# Patient Record
Sex: FEMALE | Race: BLACK OR AFRICAN AMERICAN | Employment: FULL TIME | URBAN - METROPOLITAN AREA
[De-identification: names, ages, dates, MRNs, and addresses within clinical notes are randomized per-mention and may not be internally consistent; named-entity substitution may affect disease eponyms.]

---

## 2018-04-27 ENCOUNTER — HOSPITAL ENCOUNTER (OUTPATIENT)
Dept: MAMMOGRAPHY | Age: 53
Discharge: HOME | End: 2018-04-27
Attending: FAMILY MEDICINE
Payer: COMMERCIAL

## 2018-04-27 DIAGNOSIS — Z12.31 VISIT FOR SCREENING MAMMOGRAM: ICD-10-CM

## 2018-04-27 PROCEDURE — 77067 SCR MAMMO BI INCL CAD: CPT

## 2019-05-08 ENCOUNTER — HOSPITAL ENCOUNTER (OUTPATIENT)
Dept: MAMMOGRAPHY | Age: 54
Discharge: HOME | End: 2019-05-08
Attending: NURSE PRACTITIONER
Payer: COMMERCIAL

## 2019-05-08 DIAGNOSIS — Z12.31 VISIT FOR SCREENING MAMMOGRAM: ICD-10-CM

## 2019-05-08 PROCEDURE — 77067 SCR MAMMO BI INCL CAD: CPT

## 2020-11-23 ENCOUNTER — TRANSCRIBE ORDER (OUTPATIENT)
Dept: SCHEDULING | Age: 55
End: 2020-11-23

## 2020-11-23 DIAGNOSIS — Z12.31 ENCOUNTER FOR SCREENING MAMMOGRAM FOR MALIGNANT NEOPLASM OF BREAST: Primary | ICD-10-CM

## 2020-11-23 DIAGNOSIS — Z78.0 ASYMPTOMATIC POSTMENOPAUSAL STATE: ICD-10-CM

## 2020-12-29 ENCOUNTER — HOSPITAL ENCOUNTER (OUTPATIENT)
Dept: MAMMOGRAPHY | Age: 55
Discharge: HOME | End: 2020-12-29
Attending: INTERNAL MEDICINE
Payer: COMMERCIAL

## 2020-12-29 DIAGNOSIS — Z78.0 ASYMPTOMATIC POSTMENOPAUSAL STATE: ICD-10-CM

## 2020-12-29 DIAGNOSIS — Z12.31 ENCOUNTER FOR SCREENING MAMMOGRAM FOR MALIGNANT NEOPLASM OF BREAST: ICD-10-CM

## 2020-12-29 PROCEDURE — 77067 SCR MAMMO BI INCL CAD: CPT

## 2020-12-29 PROCEDURE — 77080 DXA BONE DENSITY AXIAL: CPT

## 2022-01-18 ENCOUNTER — TRANSCRIBE ORDER (OUTPATIENT)
Dept: SCHEDULING | Age: 57
End: 2022-01-18

## 2022-01-18 DIAGNOSIS — Z12.31 ENCOUNTER FOR SCREENING MAMMOGRAM FOR BREAST CANCER: Primary | ICD-10-CM

## 2022-02-04 ENCOUNTER — HOSPITAL ENCOUNTER (OUTPATIENT)
Dept: MAMMOGRAPHY | Age: 57
Discharge: HOME | End: 2022-02-04
Attending: INTERNAL MEDICINE
Payer: COMMERCIAL

## 2022-02-04 DIAGNOSIS — Z12.31 ENCOUNTER FOR SCREENING MAMMOGRAM FOR BREAST CANCER: ICD-10-CM

## 2022-02-04 PROCEDURE — 77067 SCR MAMMO BI INCL CAD: CPT

## 2023-02-06 ENCOUNTER — HOSPITAL ENCOUNTER (OUTPATIENT)
Dept: MAMMOGRAPHY | Age: 58
Discharge: HOME | End: 2023-02-09
Payer: COMMERCIAL

## 2023-02-06 DIAGNOSIS — Z12.31 OTHER SCREENING MAMMOGRAM: ICD-10-CM

## 2023-02-06 PROCEDURE — 77067 SCR MAMMO BI INCL CAD: CPT

## 2023-02-27 ENCOUNTER — OFFICE VISIT (OUTPATIENT)
Dept: ORTHOPEDIC SURGERY | Age: 58
End: 2023-02-27
Payer: COMMERCIAL

## 2023-02-27 VITALS — WEIGHT: 207 LBS

## 2023-02-27 DIAGNOSIS — G89.29 CHRONIC PAIN OF RIGHT KNEE: Primary | ICD-10-CM

## 2023-02-27 DIAGNOSIS — M25.561 CHRONIC PAIN OF RIGHT KNEE: Primary | ICD-10-CM

## 2023-02-27 DIAGNOSIS — G89.29 CHRONIC PAIN OF LEFT KNEE: ICD-10-CM

## 2023-02-27 DIAGNOSIS — M25.562 CHRONIC PAIN OF LEFT KNEE: ICD-10-CM

## 2023-02-27 DIAGNOSIS — M17.12 PRIMARY OSTEOARTHRITIS OF LEFT KNEE: ICD-10-CM

## 2023-02-27 DIAGNOSIS — M17.11 PRIMARY OSTEOARTHRITIS OF RIGHT KNEE: ICD-10-CM

## 2023-02-27 PROCEDURE — 20610 DRAIN/INJ JOINT/BURSA W/O US: CPT | Performed by: SPECIALIST/TECHNOLOGIST

## 2023-02-27 PROCEDURE — 99204 OFFICE O/P NEW MOD 45 MIN: CPT | Performed by: SPECIALIST/TECHNOLOGIST

## 2023-02-27 RX ORDER — DOLUTEGRAVIR SODIUM 50 MG/1
TABLET, FILM COATED ORAL
COMMUNITY
Start: 2023-01-31

## 2023-02-27 RX ORDER — MELOXICAM 15 MG/1
TABLET ORAL
COMMUNITY
Start: 2023-01-31 | End: 2023-02-27 | Stop reason: ALTCHOICE

## 2023-02-27 RX ORDER — TRIAMCINOLONE ACETONIDE 40 MG/ML
40 INJECTION, SUSPENSION INTRA-ARTICULAR; INTRAMUSCULAR ONCE
Status: COMPLETED | OUTPATIENT
Start: 2023-02-27 | End: 2023-02-27

## 2023-02-27 RX ORDER — DICLOFENAC SODIUM 75 MG/1
75 TABLET, DELAYED RELEASE ORAL 2 TIMES DAILY
Qty: 60 TABLET | Refills: 0 | Status: SHIPPED | OUTPATIENT
Start: 2023-02-27

## 2023-02-27 RX ADMIN — TRIAMCINOLONE ACETONIDE 40 MG: 40 INJECTION, SUSPENSION INTRA-ARTICULAR; INTRAMUSCULAR at 10:32

## 2023-02-27 NOTE — PROGRESS NOTES
Name: Yareli Wray  YOB: 1965  Gender: female  MRN: [id number]      CC: Knee Pain (Bilateral knee)       HPI: Yareli Wray is a 60 y.o. female who presents with Knee Pain (Bilateral knee)  Ms. Audra Bell is a new patient here for bilateral knee evaluation. She reports chronic bilateral knee pain with no injury. Notes that her right knee pain is worse than her left knee at this time. Her pain is primarily anterior, but diffuse through the knee. Also reports that she has a popping sensation in the right knee. She has had a prior cortisone injection years ago but cannot recall if it was unilateral or bilateral. She has been taking Slnvykpzo54 mg daily reports that the meloxicam has started to lose its efficacy. ROS/Meds/PSH/PMH/FH/SH: I personally reviewed the patients standard intake form. Below are the pertinents    Tobacco:  reports that she has never smoked. She does not have any smokeless tobacco history on file. Diabetes: none  Other: HIV    Physical Examination:  General: no acute distress  Lungs: breathing easily  CV: regular rhythm by pulse  Right Knee: Negative effusion present. Tenderness to palpation of the medial joint line. Full active and passive range of motion with no pain in the extremes. Negative ligamentous exam x4. Negative Ludmila's, bounce home test.  Left knee: Negative effusion present. Tenderness to palpation the medial joint line. Full active and passive range of motion with no pain in the extremes. Negative ligamentous exam x4. Negative Ludmila's, bounce home test.        Imaging:   Knee XR: 4 views     Clinical Indication  1. Chronic pain of right knee    2.  Chronic pain of left knee           Report: AP, lateral, PA flexion, sunrise views of the Bilateral knee demonstrates no acute fracture dislocation advanced degenerative changes localized to the medial compartment with osteophyte formation bilaterally    Impression: Bilateral DJD as above All imaging interpreted by myself MINA Fischer independent of radiology review        Assessment:     ICD-10-CM    1. Chronic pain of right knee  M25.561 XR KNEE BILATERAL STANDARD EXTENDED VW    G89.29 CANCELED: XR Knee Bilateral Standard      2. Chronic pain of left knee  M25.562 XR KNEE BILATERAL STANDARD EXTENDED VW    G89.29 CANCELED: XR Knee Bilateral Standard          Plan: Think the majority of the patient's bilateral knee pain is due to medial compartment advanced degenerative changes with osteoarthritis. I discussed with the patient the only surgical intervention for osteoarthritis would be total knee replacement. She does not wish to proceed with total knee replacement at this time and would like to proceed with conservative treatment options. I discussed treatment options to include corticosteroid injection, formal physical therapy, viscosupplementation and continued use of her NSAID. She reports that her Mobic is losing its efficacy therefore I will switch her from Mobic to Voltaren 75 mg twice daily. I will provide her with a home exercise program for quad strengthening and encouraged her to perform these exercises 2-3 times per week to maintain her lower extremity strength and decrease the stress on her joints. I think she would also benefit from corticosteroid injection which she agreed proceed with today. I will also refer her for approval for viscosupplementation. The patient elected to proceed with an intraarticular knee injection today. After verbal informed consent was obtained after sterile prep the Bilateral knee  was injected from a anterior lateral approach with 4 cc of 0.25% Marcaine and 1 cc of 40 mg Kenalog in each knee. The patient tolerated the procedure well was given postinjection flare precautions.             MINA Fischer  Orthopaedics and Sports Medicine

## 2023-09-22 ENCOUNTER — OFFICE VISIT (OUTPATIENT)
Dept: ORTHOPEDIC SURGERY | Age: 58
End: 2023-09-22

## 2023-09-22 DIAGNOSIS — M17.12 PRIMARY OSTEOARTHRITIS OF LEFT KNEE: Primary | ICD-10-CM

## 2023-09-22 DIAGNOSIS — M17.11 PRIMARY OSTEOARTHRITIS OF RIGHT KNEE: ICD-10-CM

## 2023-09-22 RX ORDER — TRIAMCINOLONE ACETONIDE 40 MG/ML
40 INJECTION, SUSPENSION INTRA-ARTICULAR; INTRAMUSCULAR ONCE
Status: COMPLETED | OUTPATIENT
Start: 2023-09-22 | End: 2023-09-22

## 2023-09-22 RX ADMIN — TRIAMCINOLONE ACETONIDE 40 MG: 40 INJECTION, SUSPENSION INTRA-ARTICULAR; INTRAMUSCULAR at 09:16

## 2023-09-22 NOTE — PROGRESS NOTES
Name: Apolinar Lebron  YOB: 1965  Gender: female  MRN: [id number]      CC: Knee Pain (B)       HPI: Apolinar Lebron is a 60 y.o. female who returns for follow up on  her bilateral knee pain. She was last seen in February 2023 and received bilateral knee injections. She reports that she got 100% relief from previous injections and wishes to repeat this today. She does however report that she is interested in having her knee pain addressed definitively with surgical consultation. Physical Examination:  General: no acute distress  Lungs: breathing easily  CV: regular rhythm by pulse  Right Knee: Negative effusion present. Tenderness to palpation of the medial joint line. Full active and passive range of motion with no pain in the extremes. Negative ligamentous exam x4. Negative Ludmila's, bounce home test.  Left knee: Negative effusion present. Tenderness to palpation the medial joint line. Full active and passive range of motion with no pain in the extremes. Negative ligamentous exam x4. Negative Ludmila's, bounce home test        Assessment:   1. Primary osteoarthritis of left knee    2. Primary osteoarthritis of right knee         Plan:   The patient bilateral knee pain continues to be due to osteoarthritis with changes worse on the left than the right with previous imaging. I discussed with the patient continued conservative treatment options include repeat corticosteroid injection and viscosupplementation however she reports that her viscosupplementation was not approved by her insurance. She was to have her acute pain addressed today however she also wishes to discuss her options for possible surgical intervention with a total joint surgeon. I informed the patient that having an injection today may delay her potential surgery up to 3 months due to potential infection risk and she states she is willing to proceed with the injection today despite this.   I will refer her to

## 2023-10-13 ENCOUNTER — OFFICE VISIT (OUTPATIENT)
Dept: ORTHOPEDIC SURGERY | Age: 58
End: 2023-10-13

## 2023-10-13 VITALS — BODY MASS INDEX: 37.25 KG/M2 | HEIGHT: 63 IN | WEIGHT: 210.2 LBS

## 2023-10-13 DIAGNOSIS — M25.562 PAIN IN BOTH KNEES, UNSPECIFIED CHRONICITY: Primary | ICD-10-CM

## 2023-10-13 DIAGNOSIS — M25.561 PAIN IN BOTH KNEES, UNSPECIFIED CHRONICITY: Primary | ICD-10-CM

## 2023-10-13 NOTE — PROGRESS NOTES
distention. Neuro: No obvious neurologic deficit. Grossly moves bilateral upper extremities without motor or sensory deficits. No gross weakness noted in the lower extremities. No hyporeflexia or hyperreflexia noted. Vascular: No gross arterial or venous deficiency noted. DP and PT pulses are palpable in the lower extremities  Lymphatic: No lymphedema noted in the lower extremities. Skin: No prior incisions noted about the right knee. No obvious rashes noted about the area. No skin changes noted about the knee or about the adjacent thigh or leg. Extremities:  Patient ambulates with an antalgic gait. There is pain with ROM of the right knee. Range of motion is 0-120. Trace effusion noted in the knee. Patellofemoral crepitus is present. Distally the patient shows no neurologic deficit. Xrays (obtained either today or previously)    Heading: Knee Xrays  Views: AP knee, skiers PA, lateral knee, sunrise view bilateral knee  Clinical indication: Bilateral Knee Pain   Findings: Xrays of the knees obtained today or previously show tricompartmental bone-on-bone arthritic changes of the bilateral knee with associated osteophyte formation and subchondral sclerosis. Impression: Bilateral Knee osteoarthritis    Guillermo Zheng MD      Assessment:   1. Arthritis of the Bilateral knee    Plan:  Differential diagnosis and treatment options have been discussed with the patient. Risks, benefits and alternatives of each were discussed and patient questions answered. The patient understands the nature of knee arthritis and that this is generally a progressive condition. At this point the patient has failed the aforementioned treatment modalities and would like to proceed with Total Knee Replacement. TREATMENT:    Total Knee Replacement- The patient has failed previous conservative treatment for this condition including anti-inflammatories, injections and lifestyle modifications.  The necessity for joint replacement is

## 2023-11-27 DIAGNOSIS — M17.12 PRIMARY OSTEOARTHRITIS OF LEFT KNEE: Primary | ICD-10-CM

## 2023-11-27 DIAGNOSIS — M21.962 ACQUIRED DEFORMITY OF LEFT KNEE: ICD-10-CM

## 2023-12-04 ENCOUNTER — TELEPHONE (OUTPATIENT)
Dept: ORTHOPEDIC SURGERY | Age: 58
End: 2023-12-04

## 2023-12-04 NOTE — TELEPHONE ENCOUNTER
Received the mutual [place] std form from , put in Dr Gaetano Edman Sherree Nyhan asst box to complete 12/4/23,MB

## 2024-01-02 ENCOUNTER — CLINICAL DOCUMENTATION (OUTPATIENT)
Dept: ORTHOPEDIC SURGERY | Age: 59
End: 2024-01-02

## 2024-01-02 NOTE — PROGRESS NOTES
Correction [employer]/FMLA form was a , called patient but she said she already had a copy, copy to scanning.

## 2024-01-12 ENCOUNTER — PREP FOR PROCEDURE (OUTPATIENT)
Dept: ORTHOPEDIC SURGERY | Age: 59
End: 2024-01-12

## 2024-01-12 ENCOUNTER — CLINICAL DOCUMENTATION (OUTPATIENT)
Dept: ORTHOPEDIC SURGERY | Age: 59
End: 2024-01-12

## 2024-01-12 DIAGNOSIS — Z01.818 PRE-OP EVALUATION: Primary | ICD-10-CM

## 2024-01-12 RX ORDER — SODIUM CHLORIDE 0.9 % (FLUSH) 0.9 %
5-40 SYRINGE (ML) INJECTION PRN
Status: CANCELLED | OUTPATIENT
Start: 2024-01-12

## 2024-01-12 RX ORDER — SODIUM CHLORIDE 0.9 % (FLUSH) 0.9 %
5-40 SYRINGE (ML) INJECTION EVERY 12 HOURS SCHEDULED
Status: CANCELLED | OUTPATIENT
Start: 2024-01-12

## 2024-01-12 RX ORDER — SODIUM CHLORIDE 9 MG/ML
INJECTION, SOLUTION INTRAVENOUS PRN
Status: CANCELLED | OUTPATIENT
Start: 2024-01-12

## 2024-01-16 ENCOUNTER — HOSPITAL ENCOUNTER (OUTPATIENT)
Dept: SURGERY | Age: 59
Discharge: HOME | End: 2024-01-19
Payer: COMMERCIAL

## 2024-01-16 ENCOUNTER — HOSPITAL ENCOUNTER (OUTPATIENT)
Dept: REHABILITATION | Age: 59
Discharge: HOME | End: 2024-01-19
Payer: COMMERCIAL

## 2024-01-16 VITALS
WEIGHT: 215 LBS | HEART RATE: 94 BPM | OXYGEN SATURATION: 100 % | SYSTOLIC BLOOD PRESSURE: 145 MMHG | HEIGHT: 63 IN | BODY MASS INDEX: 38.09 KG/M2 | TEMPERATURE: 98.6 F | RESPIRATION RATE: 16 BRPM | DIASTOLIC BLOOD PRESSURE: 73 MMHG

## 2024-01-16 DIAGNOSIS — Z01.818 PRE-OP EVALUATION: ICD-10-CM

## 2024-01-16 LAB
ALBUMIN SERPL-MCNC: 3.1 G/DL (ref 3.2–4.6)
ANION GAP SERPL CALC-SCNC: 4 MMOL/L (ref 2–11)
BASOPHILS # BLD: 0 K/UL (ref 0–0.2)
BASOPHILS NFR BLD: 1 % (ref 0–2)
BUN SERPL-MCNC: 12 MG/DL (ref 8–23)
CALCIUM SERPL-MCNC: 9.1 MG/DL (ref 8.3–10.4)
CHLORIDE SERPL-SCNC: 110 MMOL/L (ref 103–113)
CO2 SERPL-SCNC: 29 MMOL/L (ref 21–32)
CREAT SERPL-MCNC: 1.05 MG/DL (ref 0.6–1)
DIFFERENTIAL METHOD BLD: ABNORMAL
EKG ATRIAL RATE: 75 BPM
EKG DIAGNOSIS: NORMAL
EKG P AXIS: 63 DEGREES
EKG P-R INTERVAL: 150 MS
EKG Q-T INTERVAL: 358 MS
EKG QRS DURATION: 72 MS
EKG QTC CALCULATION (BAZETT): 399 MS
EKG R AXIS: 13 DEGREES
EKG T AXIS: -13 DEGREES
EKG VENTRICULAR RATE: 75 BPM
EOSINOPHIL # BLD: 0.2 K/UL (ref 0–0.8)
EOSINOPHIL NFR BLD: 4 % (ref 0.5–7.8)
ERYTHROCYTE [DISTWIDTH] IN BLOOD BY AUTOMATED COUNT: 12.9 % (ref 11.9–14.6)
EST. AVERAGE GLUCOSE BLD GHB EST-MCNC: 97 MG/DL
GLUCOSE SERPL-MCNC: 84 MG/DL (ref 65–100)
HBA1C MFR BLD: 5 % (ref 4.8–5.6)
HCT VFR BLD AUTO: 31.6 % (ref 35.8–46.3)
HGB BLD-MCNC: 10.3 G/DL (ref 11.7–15.4)
IMM GRANULOCYTES # BLD AUTO: 0 K/UL (ref 0–0.5)
IMM GRANULOCYTES NFR BLD AUTO: 0 % (ref 0–5)
INR PPP: 1.1
LYMPHOCYTES # BLD: 1.7 K/UL (ref 0.5–4.6)
LYMPHOCYTES NFR BLD: 27 % (ref 13–44)
MCH RBC QN AUTO: 29.4 PG (ref 26.1–32.9)
MCHC RBC AUTO-ENTMCNC: 32.6 G/DL (ref 31.4–35)
MCV RBC AUTO: 90.3 FL (ref 82–102)
MONOCYTES # BLD: 0.5 K/UL (ref 0.1–1.3)
MONOCYTES NFR BLD: 7 % (ref 4–12)
MRSA DNA SPEC QL NAA+PROBE: NOT DETECTED
NEUTS SEG # BLD: 3.8 K/UL (ref 1.7–8.2)
NEUTS SEG NFR BLD: 61 % (ref 43–78)
NRBC # BLD: 0 K/UL (ref 0–0.2)
PLATELET # BLD AUTO: 244 K/UL (ref 150–450)
PMV BLD AUTO: 9.6 FL (ref 9.4–12.3)
POTASSIUM SERPL-SCNC: 3.1 MMOL/L (ref 3.5–5.1)
PROTHROMBIN TIME: 13.4 SEC (ref 11.3–14.9)
RBC # BLD AUTO: 3.5 M/UL (ref 4.05–5.2)
S AUREUS CPE NOSE QL NAA+PROBE: NOT DETECTED
SODIUM SERPL-SCNC: 143 MMOL/L (ref 136–146)
WBC # BLD AUTO: 6.2 K/UL (ref 4.3–11.1)

## 2024-01-16 PROCEDURE — 97161 PT EVAL LOW COMPLEX 20 MIN: CPT

## 2024-01-16 PROCEDURE — 87641 MR-STAPH DNA AMP PROBE: CPT

## 2024-01-16 PROCEDURE — 85610 PROTHROMBIN TIME: CPT

## 2024-01-16 PROCEDURE — 85025 COMPLETE CBC W/AUTO DIFF WBC: CPT

## 2024-01-16 PROCEDURE — 93005 ELECTROCARDIOGRAM TRACING: CPT

## 2024-01-16 PROCEDURE — 80048 BASIC METABOLIC PNL TOTAL CA: CPT

## 2024-01-16 PROCEDURE — 93010 ELECTROCARDIOGRAM REPORT: CPT | Performed by: INTERNAL MEDICINE

## 2024-01-16 PROCEDURE — 80307 DRUG TEST PRSMV CHEM ANLYZR: CPT

## 2024-01-16 PROCEDURE — 83036 HEMOGLOBIN GLYCOSYLATED A1C: CPT

## 2024-01-16 PROCEDURE — 82040 ASSAY OF SERUM ALBUMIN: CPT

## 2024-01-16 RX ORDER — CETIRIZINE HYDROCHLORIDE 10 MG/1
10 TABLET ORAL DAILY
COMMUNITY

## 2024-01-16 RX ORDER — ALBUTEROL SULFATE 90 UG/1
2 AEROSOL, METERED RESPIRATORY (INHALATION) EVERY 6 HOURS PRN
COMMUNITY

## 2024-01-16 RX ORDER — MELOXICAM 15 MG/1
15 TABLET ORAL DAILY
COMMUNITY

## 2024-01-16 ASSESSMENT — KOOS JR
RISING FROM SITTING: 3
TWISING OR PIVOTING ON KNEE: 3
GOING UP OR DOWN STAIRS: 4
KOOS JR TOTAL INTERVAL SCORE: 31.307
STANDING UPRIGHT: 3
STRAIGHTENING KNEE FULLY: 3
BENDING TO THE FLOOR TO PICK UP OBJECT: 3
HOW SEVERE IS YOUR KNEE STIFFNESS AFTER FIRST WAKING IN MORNING: 3

## 2024-01-16 ASSESSMENT — PROMIS GLOBAL HEALTH SCALE
IN GENERAL, PLEASE RATE HOW WELL YOU CARRY OUT YOUR USUAL SOCIAL ACTIVITIES (INCLUDES ACTIVITIES AT HOME, AT WORK, AND IN YOUR COMMUNITY, AND RESPONSIBILITIES AS A PARENT, CHILD, SPOUSE, EMPLOYEE, FRIEND, ETC) [ON A SCALE OF 1 (POOR) TO 5 (EXCELLENT)]?: 4
IN GENERAL, HOW WOULD YOU RATE YOUR SATISFACTION WITH YOUR SOCIAL ACTIVITIES AND RELATIONSHIPS [ON A SCALE OF 1 (POOR) TO 5 (EXCELLENT)]?: 3
HOW IS THE PROMIS V1.1 BEING ADMINISTERED?: 0
IN THE PAST 7 DAYS, HOW WOULD YOU RATE YOUR PAIN ON AVERAGE [ON A SCALE FROM 0 (NO PAIN) TO 10 (WORST IMAGINABLE PAIN)]?: 9
SUM OF RESPONSES TO QUESTIONS 2, 4, 5, & 10: 15
IN GENERAL, HOW WOULD YOU RATE YOUR PHYSICAL HEALTH [ON A SCALE OF 1 (POOR) TO 5 (EXCELLENT)]?: 4
IN GENERAL, HOW WOULD YOU RATE YOUR MENTAL HEALTH, INCLUDING YOUR MOOD AND YOUR ABILITY TO THINK [ON A SCALE OF 1 (POOR) TO 5 (EXCELLENT)]?: 4
IN GENERAL, WOULD YOU SAY YOUR HEALTH IS...[ON A SCALE OF 1 (POOR) TO 5 (EXCELLENT)]: 4
IN GENERAL, WOULD YOU SAY YOUR QUALITY OF LIFE IS...[ON A SCALE OF 1 (POOR) TO 5 (EXCELLENT)]: 4
WHO IS THE PERSON COMPLETING THE PROMIS V1.1 SURVEY?: 0
SUM OF RESPONSES TO QUESTIONS 3, 6, 7, & 8: 18
IN THE PAST 7 DAYS, HOW OFTEN HAVE YOU BEEN BOTHERED BY EMOTIONAL PROBLEMS, SUCH AS FEELING ANXIOUS, DEPRESSED, OR IRRITABLE [ON A SCALE FROM 1 (NEVER) TO 5 (ALWAYS)]?: 4
IN THE PAST 7 DAYS, HOW WOULD YOU RATE YOUR FATIGUE ON AVERAGE [ON A SCALE FROM 1 (NONE) TO 5 (VERY SEVERE)]?: 3
TO WHAT EXTENT ARE YOU ABLE TO CARRY OUT YOUR EVERYDAY PHYSICAL ACTIVITIES SUCH AS WALKING, CLIMBING STAIRS, CARRYING GROCERIES, OR MOVING A CHAIR [ON A SCALE OF 1 (NOT AT ALL) TO 5 (COMPLETELY)]?: 2

## 2024-01-16 ASSESSMENT — PAIN DESCRIPTION - DESCRIPTORS: DESCRIPTORS: ACHING

## 2024-01-16 ASSESSMENT — PAIN SCALES - GENERAL: PAINLEVEL_OUTOF10: 0

## 2024-01-16 ASSESSMENT — PAIN DESCRIPTION - LOCATION: LOCATION: KNEE

## 2024-01-16 NOTE — PERIOP NOTE
PLEASE CONTINUE TAKING ALL PRESCRIPTION MEDICATIONS UP TO THE DAY OF SURGERY UNLESS OTHERWISE DIRECTED BELOW.     DISCONTINUE all over the counter vitamins, supplements, and herbals 21 days prior to surgery. DISCONTINUE Non-Steroidal Anti-Inflammatory (NSAIDS) such as Advil, Ibuprofen, Motrin, Naproxen, and Aleve 5 days prior to surgery.      *IT IS OK TO TAKE TYLENOL, Allergy medication, and indigestion medications*     Prescription medications to HOLD     Hold Meloxicam 5 days prior to surgery               CONTINUE all other prescriptions like normal up until the day of surgery--TAKE ONLY THE BELOW MEDICATIONS THE DAY OF SURGERY.    Albuterol inhaler if needed, Zyrtec               Comments   The day before surgery please take 2 Tylenol in the morning and then again before bed. You may use either regular or extra strength.        Bring Inhalers, CPAP, Dynahex soap, and incentive spirometer back to the hospital.          Please do not bring home medications with you on the day of surgery unless otherwise directed by your nurse.  If you are instructed to bring home medications, please give them to your nurse as they will be administered by the nursing staff.     If you have any questions, please call [facility] [phone].     A copy of this note was provided to the patient for reference.

## 2024-01-16 NOTE — PROGRESS NOTES
Total Joint Surgery Preoperative Chart Review      Patient ID:  Sierra Grace  [id number]  59 y.o.  1965  Surgeon: Dr. Aguilar  Date of Surgery: 02/07/2024  Procedure: Total Left Knee Arthroplasty  Primary Care Physician: Kennedi Diggs [phone]  Specialty Physician(s):      Subjective:   Sierra Grace is a 59 y.o. Black /  female who presents for preoperative evaluation for Total Left Knee arthroplasty.    This is a preoperative chart review note based on data collected by the nurse at the surgical Pre-Assessment visit.    Past Medical History:   Diagnosis Date    Asymptomatic postmenopausal state     Autoimmune disease (HCC)     HIV    Daytime somnolence     HIV (human immunodeficiency virus infection) (HCC) 12/2/2016    followed by PCP, per note (12/2023) \"Most recent labs performed on 12/1/2023 show a CD4 count of 1,030/51%-previously 831/49%-before that 921/54%-before that 950/53%-before that 843/50%. Viral load remains undetectable less than 20 which is normal for this patient.\"    CASSY on CPAP     newly dx--waiting for CPAP    Other ill-defined conditions(799.89)     Tooth abcess    Primary osteoarthritis of left knee 11/27/2023      Past Surgical History:   Procedure Laterality Date    ORTHOPEDIC SURGERY      R foot fx     Family History   Problem Relation Age of Onset    Breast Cancer Sister 42      Social History     Tobacco Use    Smoking status: Never    Smokeless tobacco: Not on file   Substance Use Topics    Alcohol use: No       Prior to Admission medications    Medication Sig Start Date End Date Taking? Authorizing Provider   albuterol sulfate HFA (VENTOLIN HFA) 108 (90 Base) MCG/ACT inhaler Inhale 2 puffs into the lungs every 6 hours as needed for Wheezing   Yes Provider, MD iKrit   cetirizine (ZYRTEC) 10 MG tablet Take 1 tablet by mouth daily   Yes Provider, MD Kirit   darunavir-cobicistat 800-150 MG TABS tablet Take 1 tablet by mouth at bedtime   Yes

## 2024-01-16 NOTE — PROGRESS NOTES
surgery.    EDUCATION: Education Given To: Patient, Family  Education Provided: Role of Therapy, Home Exercise Program  Education Method: Demonstration, Verbal  Education Outcome: Verbalized understanding    MEDICAL NECESSITY: Ms. Grace is expected to optimize herlower extremity strength and ROM in preparation for joint replacement surgery.    REASON FOR CONTINUED SERVICES: Achieve baseline assesment of musculoskeletal system, functional mobility and home environment., educate in PT HEP in preparation for surgery, educate in hospital plan of care.    COMPLIANCE WITH PROGRAM/EXERCISE: compliant most of the time.    TOTAL TREATMENT DURATION:  Time In: 0920  Time Out: 0930  Minutes: 10    Regarding Sierra Grace's therapy, I certify that the treatment plan above will be carried out by a therapist or under their direction.  Thank you for this referral,  MADDY ROE, PT

## 2024-01-16 NOTE — PERIOP NOTE
Patient verified name and .    Order for consent found in EHR and matches case posting; patient verified.     Type 3 surgery, joint assessment complete.    Labs per surgeon: CBC,BMP, PT/INR, HgbA1c, Albumin, Nicotine; results pending.  Labs per anesthesia protocol: no additional  EKG: Completed today; results to be reviewed by anesthesia. Charge nurse to f/u.       Pulmonary office note (24), PCP office note (24), and Oncology/Hematology office note (23)    MRSA/MSSA swab collected; pharmacy to review and dose antibiotic as appropriate.     Hospital approved surgical skin cleanser and instructions to return bottle on DOS given per hospital policy.    Patient provided with handouts including Guide to Surgery, Pain Management, Hand Hygiene, Blood Transfusion Education, and [place] [facility] Brochure.    Patient answered medical/surgical history questions at their best of ability. All prior to admission medications documented in [facility]. Original medication prescription bottle NOT visualized during patient appointment.     Patient instructed to hold all vitamins, supplements, herbals 3 weeks prior to surgery and NSAIDS/Meloxicam 5 days prior to surgery.     Patient teach back successful and patient demonstrates knowledge of instruction.

## 2024-01-16 NOTE — PERIOP NOTE
The below lab results are within anesthesia guidelines, no follow-up required. Labs automatically routed to ordering provider via Epic documentation. Per Emelina Hartman NP note (1/16/24) \"Low potassium today. Will repeat DOS.\" Order signed and held in EHR.        Latest Reference Range & Units 01/16/24 08:56   Sodium 136 - 146 mmol/L 143   Potassium 3.5 - 5.1 mmol/L 3.1 (L)   Chloride 103 - 113 mmol/L 110   CO2 21 - 32 mmol/L 29   BUN,BUNPL 8 - 23 MG/DL 12   Creatinine 0.6 - 1.0 MG/DL 1.05 (H)   Anion Gap 2 - 11 mmol/L 4   Est, Glom Filt Rate >60 ml/min/1.73m2 >60   Glucose, Random 65 - 100 mg/dL 84   CALCIUM, SERUM, 367104 8.3 - 10.4 MG/DL 9.1   Albumin 3.2 - 4.6 g/dL 3.1 (L)   Hemoglobin A1C 4.8 - 5.6 % 5.0   eAG (mg/dL) mg/dL 97   WBC 4.3 - 11.1 K/uL 6.2   RBC 4.05 - 5.2 M/uL 3.50 (L)   Hemoglobin Quant 11.7 - 15.4 g/dL 10.3 (L)   Hematocrit 35.8 - 46.3 % 31.6 (L)   MCV 82.0 - 102.0 FL 90.3   MCH 26.1 - 32.9 PG 29.4   MCHC 31.4 - 35.0 g/dL 32.6   MPV 9.4 - 12.3 FL 9.6   RDW 11.9 - 14.6 % 12.9   Platelet Count 150 - 450 K/uL 244   Neutrophils % 43 - 78 % 61   Lymphocyte % 13 - 44 % 27   Monocytes % 4.0 - 12.0 % 7   Eosinophils % 0.5 - 7.8 % 4   Basophils % 0.0 - 2.0 % 1   Neutrophils Absolute 1.7 - 8.2 K/UL 3.8   Lymphocytes Absolute 0.5 - 4.6 K/UL 1.7   Monocytes Absolute 0.1 - 1.3 K/UL 0.5   Eosinophils Absolute 0.0 - 0.8 K/UL 0.2   Basophils Absolute 0.0 - 0.2 K/UL 0.0   Differential Type -   AUTOMATED   Immature Granulocytes 0.0 - 5.0 % 0   Nucleated Red Blood Cells 0.0 - 0.2 K/uL 0.00   Absolute Immature Granulocyte 0.0 - 0.5 K/UL 0.0   Prothrombin Time 11.3 - 14.9 sec 13.4   INR -   1.1   MRSA/STAPH AUREUS DNA  Rpt   (L): Data is abnormally low  (H): Data is abnormally high  Rpt: View report in Results Review for more information

## 2024-01-17 LAB
COMMENT:: NORMAL
COTININE UR QL SCN: NEGATIVE NG/ML

## 2024-01-18 NOTE — PROGRESS NOTES
NICOTINE AND METABOLITES, URINE  Order: [id number]  Status: Final result       Visible to patient: Yes (seen)       Next appt: 02/02/2024 at 11:05 AM in Orthopedic Surgery (Ebenezer Aguilar MD)       Dx: Pre-op evaluation    0 Result Notes       Component  Ref Range & Units 1/16/24 0856 Resulting Agency   Cotinine Screen, Ur  Gahsfc=815 ng/mL Negative [facility]   Comment:    Comment [facility]   Comment: (NOTE)  This analysis is performed by immunoassay. Positive findings are  unconfirmed analytical test results; if results do not support  expected clinical finding, confirmation by an alternate methodology  is recommended. Patient metabolic variables, specific drug chemistry,  and specimen characteristics can affect test outcome.  Technical consultation is available at [email or web address], or  call toll free [phone].  Performed At: [facility]  [address], [place] [id number]  Erich Ott MD Ph:[phone]  Performed At: [facility]  [address], [place] [id number]  Adalgisa Kingsley PhD Ph:[phone]              Specimen Collected: 01/16/24 08:56 EST Last Resulted: 01/17/24 14:37 EST

## 2024-01-22 ENCOUNTER — CLINICAL DOCUMENTATION (OUTPATIENT)
Dept: ORTHOPEDIC SURGERY | Age: 59
End: 2024-01-22

## 2024-01-26 DIAGNOSIS — M17.12 PRIMARY OSTEOARTHRITIS OF LEFT KNEE: ICD-10-CM

## 2024-01-26 DIAGNOSIS — M21.962 ACQUIRED DEFORMITY OF LEFT KNEE: ICD-10-CM

## 2024-01-28 DIAGNOSIS — G89.18 POSTOPERATIVE PAIN: Primary | ICD-10-CM

## 2024-01-28 RX ORDER — CYCLOBENZAPRINE HCL 5 MG
5 TABLET ORAL 3 TIMES DAILY PRN
Qty: 30 TABLET | Refills: 0 | Status: SHIPPED | OUTPATIENT
Start: 2024-01-28 | End: 2024-02-07

## 2024-01-28 RX ORDER — OMEPRAZOLE 40 MG/1
40 CAPSULE, DELAYED RELEASE ORAL DAILY
Qty: 30 CAPSULE | Refills: 0 | Status: SHIPPED | OUTPATIENT
Start: 2024-01-28

## 2024-01-28 RX ORDER — GABAPENTIN 300 MG/1
300 CAPSULE ORAL 2 TIMES DAILY
Qty: 30 CAPSULE | Refills: 0 | Status: SHIPPED | OUTPATIENT
Start: 2024-01-28 | End: 2024-02-12

## 2024-01-28 RX ORDER — MELOXICAM 15 MG/1
15 TABLET ORAL DAILY
Qty: 30 TABLET | Refills: 2 | Status: SHIPPED | OUTPATIENT
Start: 2024-01-28

## 2024-01-28 RX ORDER — ACETAMINOPHEN 500 MG
1000 TABLET ORAL EVERY 6 HOURS PRN
Qty: 180 TABLET | Refills: 2 | Status: SHIPPED | OUTPATIENT
Start: 2024-01-28

## 2024-01-28 RX ORDER — ASPIRIN 81 MG/1
81 TABLET ORAL 2 TIMES DAILY
Qty: 60 TABLET | Refills: 0 | Status: SHIPPED | OUTPATIENT
Start: 2024-01-28

## 2024-01-28 RX ORDER — ZOLPIDEM TARTRATE 5 MG/1
5 TABLET ORAL NIGHTLY PRN
Qty: 60 TABLET | Refills: 0 | Status: SHIPPED | OUTPATIENT
Start: 2024-01-28 | End: 2024-03-28

## 2024-01-28 RX ORDER — OXYCODONE HYDROCHLORIDE 5 MG/1
10 TABLET ORAL EVERY 4 HOURS PRN
Qty: 60 TABLET | Refills: 0 | Status: SHIPPED | OUTPATIENT
Start: 2024-01-28 | End: 2024-02-04

## 2024-01-28 RX ORDER — SENNOSIDES A AND B 8.6 MG/1
1 TABLET, FILM COATED ORAL 2 TIMES DAILY
Qty: 30 TABLET | Refills: 2 | Status: SHIPPED | OUTPATIENT
Start: 2024-01-28

## 2024-01-28 RX ORDER — ONDANSETRON 4 MG/1
4 TABLET, FILM COATED ORAL 3 TIMES DAILY PRN
Qty: 30 TABLET | Refills: 1 | Status: SHIPPED | OUTPATIENT
Start: 2024-01-28

## 2024-02-02 ENCOUNTER — OFFICE VISIT (OUTPATIENT)
Dept: ORTHOPEDIC SURGERY | Age: 59
End: 2024-02-02

## 2024-02-02 DIAGNOSIS — M17.12 PRIMARY OSTEOARTHRITIS OF LEFT KNEE: Primary | ICD-10-CM

## 2024-02-02 NOTE — PROGRESS NOTES
Patient ID:  Sierra Grace  [id number]  59 y.o.  1965    Today: February 2, 2024          CC:  Left  Knee pain    HPI:   The patient has end stage arthritis of the left knee. The patient was evaluated and examined during consultation prior to today. The patient complains of knee pain with activities, reports pain as mostly occurring along the joint lines, reports stiffness of the knee after inactivity, and swelling/pain at the end of the day and after increased physical activity. The pain affects the patient’s activities of daily living and quality of life. The patient has attempted and exhausted conservative treatment. There have been no changes to the patient's orthopedic condition since the last office visit.    Past Medical History:  Past Medical History:   Diagnosis Date    Asymptomatic postmenopausal state     Autoimmune disease (HCC)     HIV    Daytime somnolence     HIV (human immunodeficiency virus infection) (HCC) 12/2/2016    followed by PCP, per note (12/2023) \"Most recent labs performed on 12/1/2023 show a CD4 count of 1,030/51%-previously 831/49%-before that 921/54%-before that 950/53%-before that 843/50%. Viral load remains undetectable less than 20 which is normal for this patient.\"    CASSY on CPAP     newly dx--waiting for CPAP    Other ill-defined conditions(823.89)     Tooth abcess    Primary osteoarthritis of left knee 11/27/2023       Past Surgical History:  Past Surgical History:   Procedure Laterality Date    ORTHOPEDIC SURGERY      R foot fx        Medications:     Prior to Admission medications    Medication Sig Start Date End Date Taking? Authorizing Provider   acetaminophen (TYLENOL) 500 MG tablet Take 2 tablets by mouth every 6 hours as needed for Pain 1/28/24   Ebenezer Aguilar MD   zolpidem (AMBIEN) 5 MG tablet Take 1 tablet by mouth nightly as needed for Sleep for up to 60 days. Max Daily Amount: 10 mg 1/28/24 3/28/24  Ebenezer Aguilar MD   aspirin (ASPIRIN 81) 81 MG EC

## 2024-02-07 ENCOUNTER — HOME HEALTH ADMISSION (OUTPATIENT)
Dept: HOME HEALTH SERVICES | Facility: HOME HEALTH | Age: 59
End: 2024-02-07
Payer: COMMERCIAL

## 2024-02-07 ENCOUNTER — ANESTHESIA EVENT (OUTPATIENT)
Dept: SURGERY | Age: 59
End: 2024-02-07
Payer: COMMERCIAL

## 2024-02-07 ENCOUNTER — HOSPITAL ENCOUNTER (OUTPATIENT)
Age: 59
Discharge: HOME HEALTH | End: 2024-02-07
Attending: ORTHOPAEDIC SURGERY | Admitting: ORTHOPAEDIC SURGERY
Payer: COMMERCIAL

## 2024-02-07 ENCOUNTER — ANESTHESIA (OUTPATIENT)
Dept: SURGERY | Age: 59
End: 2024-02-07
Payer: COMMERCIAL

## 2024-02-07 VITALS
HEIGHT: 63 IN | SYSTOLIC BLOOD PRESSURE: 140 MMHG | HEART RATE: 80 BPM | BODY MASS INDEX: 37.7 KG/M2 | WEIGHT: 212.74 LBS | DIASTOLIC BLOOD PRESSURE: 70 MMHG | TEMPERATURE: 97.5 F | OXYGEN SATURATION: 97 % | RESPIRATION RATE: 16 BRPM

## 2024-02-07 PROBLEM — M17.12 OSTEOARTHRITIS OF LEFT KNEE, UNSPECIFIED OSTEOARTHRITIS TYPE: Status: ACTIVE | Noted: 2024-02-07

## 2024-02-07 LAB — POTASSIUM BLD-SCNC: 3.9 MMOL/L (ref 3.5–5.1)

## 2024-02-07 PROCEDURE — C1713 ANCHOR/SCREW BN/BN,TIS/BN: HCPCS | Performed by: ORTHOPAEDIC SURGERY

## 2024-02-07 PROCEDURE — 6370000000 HC RX 637 (ALT 250 FOR IP): Performed by: ANESTHESIOLOGY

## 2024-02-07 PROCEDURE — 6360000002 HC RX W HCPCS: Performed by: NURSE PRACTITIONER

## 2024-02-07 PROCEDURE — 6360000002 HC RX W HCPCS: Performed by: STUDENT IN AN ORGANIZED HEALTH CARE EDUCATION/TRAINING PROGRAM

## 2024-02-07 PROCEDURE — 6360000002 HC RX W HCPCS: Performed by: ANESTHESIOLOGY

## 2024-02-07 PROCEDURE — 6360000002 HC RX W HCPCS: Performed by: ORTHOPAEDIC SURGERY

## 2024-02-07 PROCEDURE — 97530 THERAPEUTIC ACTIVITIES: CPT

## 2024-02-07 PROCEDURE — 84132 ASSAY OF SERUM POTASSIUM: CPT

## 2024-02-07 PROCEDURE — 2580000003 HC RX 258: Performed by: ANESTHESIOLOGY

## 2024-02-07 PROCEDURE — 2500000003 HC RX 250 WO HCPCS: Performed by: STUDENT IN AN ORGANIZED HEALTH CARE EDUCATION/TRAINING PROGRAM

## 2024-02-07 PROCEDURE — 97165 OT EVAL LOW COMPLEX 30 MIN: CPT

## 2024-02-07 PROCEDURE — 3700000000 HC ANESTHESIA ATTENDED CARE: Performed by: ORTHOPAEDIC SURGERY

## 2024-02-07 PROCEDURE — 3600000015 HC SURGERY LEVEL 5 ADDTL 15MIN: Performed by: ORTHOPAEDIC SURGERY

## 2024-02-07 PROCEDURE — 64447 NJX AA&/STRD FEMORAL NRV IMG: CPT | Performed by: ANESTHESIOLOGY

## 2024-02-07 PROCEDURE — 97535 SELF CARE MNGMENT TRAINING: CPT

## 2024-02-07 PROCEDURE — 94760 N-INVAS EAR/PLS OXIMETRY 1: CPT

## 2024-02-07 PROCEDURE — 2720000010 HC SURG SUPPLY STERILE: Performed by: ORTHOPAEDIC SURGERY

## 2024-02-07 PROCEDURE — 7100000001 HC PACU RECOVERY - ADDTL 15 MIN: Performed by: ORTHOPAEDIC SURGERY

## 2024-02-07 PROCEDURE — 2580000003 HC RX 258: Performed by: ORTHOPAEDIC SURGERY

## 2024-02-07 PROCEDURE — 3600000005 HC SURGERY LEVEL 5 BASE: Performed by: ORTHOPAEDIC SURGERY

## 2024-02-07 PROCEDURE — C1776 JOINT DEVICE (IMPLANTABLE): HCPCS | Performed by: ORTHOPAEDIC SURGERY

## 2024-02-07 PROCEDURE — 2709999900 HC NON-CHARGEABLE SUPPLY: Performed by: ORTHOPAEDIC SURGERY

## 2024-02-07 PROCEDURE — 7100000000 HC PACU RECOVERY - FIRST 15 MIN: Performed by: ORTHOPAEDIC SURGERY

## 2024-02-07 PROCEDURE — 6370000000 HC RX 637 (ALT 250 FOR IP): Performed by: NURSE PRACTITIONER

## 2024-02-07 PROCEDURE — 3700000001 HC ADD 15 MINUTES (ANESTHESIA): Performed by: ORTHOPAEDIC SURGERY

## 2024-02-07 PROCEDURE — 97161 PT EVAL LOW COMPLEX 20 MIN: CPT

## 2024-02-07 DEVICE — TIBIAL COMPONENT
Type: IMPLANTABLE DEVICE | Site: KNEE | Status: FUNCTIONAL
Brand: TRIATHLON

## 2024-02-07 DEVICE — POWDER SURG CELLERATE RX 1 GM HYDROL COLLEGEN: Type: IMPLANTABLE DEVICE | Site: KNEE | Status: FUNCTIONAL

## 2024-02-07 DEVICE — TIBIAL BEARING INSERT - CS
Type: IMPLANTABLE DEVICE | Site: KNEE | Status: FUNCTIONAL
Brand: TRIATHLON

## 2024-02-07 DEVICE — COMPONENT TOT KNEE CAPPED PRIMARY K2STRYKER] STRYKER CORP]: Type: IMPLANTABLE DEVICE | Status: FUNCTIONAL

## 2024-02-07 DEVICE — PATELLA
Type: IMPLANTABLE DEVICE | Site: KNEE | Status: FUNCTIONAL
Brand: TRIATHLON

## 2024-02-07 DEVICE — CRUCIATE RETAINING FEMORAL
Type: IMPLANTABLE DEVICE | Site: KNEE | Status: FUNCTIONAL
Brand: TRIATHLON

## 2024-02-07 RX ORDER — DIPHENHYDRAMINE HCL 25 MG
25 CAPSULE ORAL EVERY 6 HOURS PRN
Status: DISCONTINUED | OUTPATIENT
Start: 2024-02-07 | End: 2024-02-07 | Stop reason: HOSPADM

## 2024-02-07 RX ORDER — SODIUM CHLORIDE 0.9 % (FLUSH) 0.9 %
5-40 SYRINGE (ML) INJECTION PRN
Status: DISCONTINUED | OUTPATIENT
Start: 2024-02-07 | End: 2024-02-07 | Stop reason: HOSPADM

## 2024-02-07 RX ORDER — SODIUM CHLORIDE 0.9 % (FLUSH) 0.9 %
5-40 SYRINGE (ML) INJECTION EVERY 12 HOURS SCHEDULED
Status: DISCONTINUED | OUTPATIENT
Start: 2024-02-07 | End: 2024-02-07 | Stop reason: HOSPADM

## 2024-02-07 RX ORDER — PROCHLORPERAZINE EDISYLATE 5 MG/ML
5 INJECTION INTRAMUSCULAR; INTRAVENOUS
Status: DISCONTINUED | OUTPATIENT
Start: 2024-02-07 | End: 2024-02-07 | Stop reason: HOSPADM

## 2024-02-07 RX ORDER — HYDROMORPHONE HYDROCHLORIDE 1 MG/ML
0.5 INJECTION, SOLUTION INTRAMUSCULAR; INTRAVENOUS; SUBCUTANEOUS EVERY 5 MIN PRN
Status: DISCONTINUED | OUTPATIENT
Start: 2024-02-07 | End: 2024-02-07 | Stop reason: HOSPADM

## 2024-02-07 RX ORDER — ASPIRIN 81 MG/1
81 TABLET ORAL 2 TIMES DAILY
Status: DISCONTINUED | OUTPATIENT
Start: 2024-02-07 | End: 2024-02-07 | Stop reason: HOSPADM

## 2024-02-07 RX ORDER — NALOXONE HYDROCHLORIDE 0.4 MG/ML
0.4 INJECTION, SOLUTION INTRAMUSCULAR; INTRAVENOUS; SUBCUTANEOUS PRN
Status: DISCONTINUED | OUTPATIENT
Start: 2024-02-07 | End: 2024-02-07 | Stop reason: HOSPADM

## 2024-02-07 RX ORDER — LIDOCAINE HYDROCHLORIDE 10 MG/ML
1 INJECTION, SOLUTION INFILTRATION; PERINEURAL
Status: DISCONTINUED | OUTPATIENT
Start: 2024-02-07 | End: 2024-02-07 | Stop reason: HOSPADM

## 2024-02-07 RX ORDER — KETOROLAC TROMETHAMINE 30 MG/ML
INJECTION, SOLUTION INTRAMUSCULAR; INTRAVENOUS PRN
Status: DISCONTINUED | OUTPATIENT
Start: 2024-02-07 | End: 2024-02-07 | Stop reason: ALTCHOICE

## 2024-02-07 RX ORDER — PANTOPRAZOLE SODIUM 40 MG/1
40 TABLET, DELAYED RELEASE ORAL
Status: DISCONTINUED | OUTPATIENT
Start: 2024-02-08 | End: 2024-02-07 | Stop reason: HOSPADM

## 2024-02-07 RX ORDER — PROPOFOL 10 MG/ML
INJECTION, EMULSION INTRAVENOUS CONTINUOUS PRN
Status: DISCONTINUED | OUTPATIENT
Start: 2024-02-07 | End: 2024-02-07 | Stop reason: SDUPTHER

## 2024-02-07 RX ORDER — SENNA AND DOCUSATE SODIUM 50; 8.6 MG/1; MG/1
1 TABLET, FILM COATED ORAL 2 TIMES DAILY
Status: DISCONTINUED | OUTPATIENT
Start: 2024-02-07 | End: 2024-02-07 | Stop reason: HOSPADM

## 2024-02-07 RX ORDER — ONDANSETRON 4 MG/1
4 TABLET, ORALLY DISINTEGRATING ORAL EVERY 8 HOURS PRN
Status: DISCONTINUED | OUTPATIENT
Start: 2024-02-07 | End: 2024-02-07 | Stop reason: HOSPADM

## 2024-02-07 RX ORDER — ROPIVACAINE HYDROCHLORIDE 2 MG/ML
INJECTION, SOLUTION EPIDURAL; INFILTRATION; PERINEURAL
Status: COMPLETED | OUTPATIENT
Start: 2024-02-07 | End: 2024-02-07

## 2024-02-07 RX ORDER — TRANEXAMIC ACID 100 MG/ML
INJECTION, SOLUTION INTRAVENOUS PRN
Status: DISCONTINUED | OUTPATIENT
Start: 2024-02-07 | End: 2024-02-07 | Stop reason: SDUPTHER

## 2024-02-07 RX ORDER — TOBRAMYCIN 1.2 G/30ML
INJECTION, POWDER, LYOPHILIZED, FOR SOLUTION INTRAVENOUS PRN
Status: DISCONTINUED | OUTPATIENT
Start: 2024-02-07 | End: 2024-02-07 | Stop reason: ALTCHOICE

## 2024-02-07 RX ORDER — FENTANYL CITRATE 50 UG/ML
100 INJECTION, SOLUTION INTRAMUSCULAR; INTRAVENOUS
Status: COMPLETED | OUTPATIENT
Start: 2024-02-07 | End: 2024-02-07

## 2024-02-07 RX ORDER — FAMOTIDINE 20 MG/1
20 TABLET, FILM COATED ORAL ONCE
Status: COMPLETED | OUTPATIENT
Start: 2024-02-07 | End: 2024-02-07

## 2024-02-07 RX ORDER — OXYCODONE HCL 10 MG/1
10 TABLET, FILM COATED, EXTENDED RELEASE ORAL EVERY 12 HOURS SCHEDULED
Status: DISCONTINUED | OUTPATIENT
Start: 2024-02-07 | End: 2024-02-07 | Stop reason: HOSPADM

## 2024-02-07 RX ORDER — OXYCODONE HYDROCHLORIDE 5 MG/1
10 TABLET ORAL EVERY 4 HOURS PRN
Status: DISCONTINUED | OUTPATIENT
Start: 2024-02-07 | End: 2024-02-07 | Stop reason: HOSPADM

## 2024-02-07 RX ORDER — ONDANSETRON 4 MG/1
8 TABLET, ORALLY DISINTEGRATING ORAL EVERY 8 HOURS PRN
Status: DISCONTINUED | OUTPATIENT
Start: 2024-02-07 | End: 2024-02-07 | Stop reason: HOSPADM

## 2024-02-07 RX ORDER — SODIUM CHLORIDE 9 MG/ML
INJECTION, SOLUTION INTRAVENOUS PRN
Status: DISCONTINUED | OUTPATIENT
Start: 2024-02-07 | End: 2024-02-07 | Stop reason: HOSPADM

## 2024-02-07 RX ORDER — ALBUTEROL SULFATE 2.5 MG/3ML
2.5 SOLUTION RESPIRATORY (INHALATION) EVERY 6 HOURS PRN
Status: DISCONTINUED | OUTPATIENT
Start: 2024-02-07 | End: 2024-02-07 | Stop reason: HOSPADM

## 2024-02-07 RX ORDER — ACETAMINOPHEN 500 MG
1000 TABLET ORAL ONCE
Status: COMPLETED | OUTPATIENT
Start: 2024-02-07 | End: 2024-02-07

## 2024-02-07 RX ORDER — KETOROLAC TROMETHAMINE 15 MG/ML
15 INJECTION, SOLUTION INTRAMUSCULAR; INTRAVENOUS EVERY 8 HOURS
Status: DISCONTINUED | OUTPATIENT
Start: 2024-02-07 | End: 2024-02-07 | Stop reason: HOSPADM

## 2024-02-07 RX ORDER — DEXAMETHASONE SODIUM PHOSPHATE 10 MG/ML
INJECTION INTRAMUSCULAR; INTRAVENOUS PRN
Status: DISCONTINUED | OUTPATIENT
Start: 2024-02-07 | End: 2024-02-07 | Stop reason: SDUPTHER

## 2024-02-07 RX ORDER — FENTANYL CITRATE 50 UG/ML
INJECTION, SOLUTION INTRAMUSCULAR; INTRAVENOUS PRN
Status: DISCONTINUED | OUTPATIENT
Start: 2024-02-07 | End: 2024-02-07 | Stop reason: SDUPTHER

## 2024-02-07 RX ORDER — ONDANSETRON 2 MG/ML
INJECTION INTRAMUSCULAR; INTRAVENOUS PRN
Status: DISCONTINUED | OUTPATIENT
Start: 2024-02-07 | End: 2024-02-07 | Stop reason: SDUPTHER

## 2024-02-07 RX ORDER — SODIUM CHLORIDE 9 MG/ML
INJECTION, SOLUTION INTRAVENOUS CONTINUOUS
Status: DISCONTINUED | OUTPATIENT
Start: 2024-02-07 | End: 2024-02-07 | Stop reason: HOSPADM

## 2024-02-07 RX ORDER — DIPHENHYDRAMINE HYDROCHLORIDE 50 MG/ML
12.5 INJECTION INTRAMUSCULAR; INTRAVENOUS
Status: DISCONTINUED | OUTPATIENT
Start: 2024-02-07 | End: 2024-02-07 | Stop reason: HOSPADM

## 2024-02-07 RX ORDER — SODIUM CHLORIDE, SODIUM LACTATE, POTASSIUM CHLORIDE, CALCIUM CHLORIDE 600; 310; 30; 20 MG/100ML; MG/100ML; MG/100ML; MG/100ML
INJECTION, SOLUTION INTRAVENOUS CONTINUOUS
Status: DISCONTINUED | OUTPATIENT
Start: 2024-02-07 | End: 2024-02-07 | Stop reason: HOSPADM

## 2024-02-07 RX ORDER — OXYCODONE HYDROCHLORIDE 5 MG/1
10 TABLET ORAL ONCE
Status: COMPLETED | OUTPATIENT
Start: 2024-02-07 | End: 2024-02-07

## 2024-02-07 RX ORDER — OXYCODONE HYDROCHLORIDE 5 MG/1
5 TABLET ORAL ONCE
Status: COMPLETED | OUTPATIENT
Start: 2024-02-07 | End: 2024-02-07

## 2024-02-07 RX ORDER — ZOLPIDEM TARTRATE 5 MG/1
5 TABLET ORAL NIGHTLY PRN
Status: DISCONTINUED | OUTPATIENT
Start: 2024-02-07 | End: 2024-02-07 | Stop reason: HOSPADM

## 2024-02-07 RX ORDER — CYCLOBENZAPRINE HCL 5 MG
5 TABLET ORAL 3 TIMES DAILY PRN
Status: DISCONTINUED | OUTPATIENT
Start: 2024-02-07 | End: 2024-02-07 | Stop reason: HOSPADM

## 2024-02-07 RX ORDER — DEXAMETHASONE SODIUM PHOSPHATE 10 MG/ML
10 INJECTION INTRAMUSCULAR; INTRAVENOUS ONCE
Status: DISCONTINUED | OUTPATIENT
Start: 2024-02-08 | End: 2024-02-07 | Stop reason: HOSPADM

## 2024-02-07 RX ORDER — ONDANSETRON 2 MG/ML
4 INJECTION INTRAMUSCULAR; INTRAVENOUS EVERY 6 HOURS PRN
Status: DISCONTINUED | OUTPATIENT
Start: 2024-02-07 | End: 2024-02-07 | Stop reason: HOSPADM

## 2024-02-07 RX ORDER — OXYCODONE HYDROCHLORIDE 5 MG/1
5 TABLET ORAL EVERY 4 HOURS PRN
Status: DISCONTINUED | OUTPATIENT
Start: 2024-02-07 | End: 2024-02-07 | Stop reason: HOSPADM

## 2024-02-07 RX ORDER — GABAPENTIN 300 MG/1
300 CAPSULE ORAL 2 TIMES DAILY
Status: DISCONTINUED | OUTPATIENT
Start: 2024-02-07 | End: 2024-02-07 | Stop reason: HOSPADM

## 2024-02-07 RX ORDER — ACETAMINOPHEN 500 MG
1000 TABLET ORAL EVERY 6 HOURS
Status: DISCONTINUED | OUTPATIENT
Start: 2024-02-07 | End: 2024-02-07 | Stop reason: HOSPADM

## 2024-02-07 RX ORDER — ROPIVACAINE HYDROCHLORIDE 2 MG/ML
INJECTION, SOLUTION EPIDURAL; INFILTRATION; PERINEURAL PRN
Status: DISCONTINUED | OUTPATIENT
Start: 2024-02-07 | End: 2024-02-07 | Stop reason: ALTCHOICE

## 2024-02-07 RX ORDER — HYDROMORPHONE HYDROCHLORIDE 1 MG/ML
1 INJECTION, SOLUTION INTRAMUSCULAR; INTRAVENOUS; SUBCUTANEOUS
Status: DISCONTINUED | OUTPATIENT
Start: 2024-02-07 | End: 2024-02-07 | Stop reason: HOSPADM

## 2024-02-07 RX ORDER — DIPHENHYDRAMINE HYDROCHLORIDE 50 MG/ML
25 INJECTION INTRAMUSCULAR; INTRAVENOUS EVERY 6 HOURS PRN
Status: DISCONTINUED | OUTPATIENT
Start: 2024-02-07 | End: 2024-02-07 | Stop reason: HOSPADM

## 2024-02-07 RX ORDER — MIDAZOLAM HYDROCHLORIDE 2 MG/2ML
2 INJECTION, SOLUTION INTRAMUSCULAR; INTRAVENOUS
Status: COMPLETED | OUTPATIENT
Start: 2024-02-07 | End: 2024-02-07

## 2024-02-07 RX ORDER — TRANEXAMIC ACID 650 MG/1
1300 TABLET ORAL
Status: DISCONTINUED | OUTPATIENT
Start: 2024-02-07 | End: 2024-02-07 | Stop reason: HOSPADM

## 2024-02-07 RX ORDER — MIDAZOLAM HYDROCHLORIDE 1 MG/ML
INJECTION INTRAMUSCULAR; INTRAVENOUS PRN
Status: DISCONTINUED | OUTPATIENT
Start: 2024-02-07 | End: 2024-02-07 | Stop reason: SDUPTHER

## 2024-02-07 RX ORDER — VANCOMYCIN HYDROCHLORIDE 1 G/20ML
INJECTION, POWDER, LYOPHILIZED, FOR SOLUTION INTRAVENOUS PRN
Status: DISCONTINUED | OUTPATIENT
Start: 2024-02-07 | End: 2024-02-07 | Stop reason: ALTCHOICE

## 2024-02-07 RX ADMIN — ROPIVACAINE HYDROCHLORIDE 20 ML: 2 INJECTION, SOLUTION EPIDURAL; INFILTRATION at 06:36

## 2024-02-07 RX ADMIN — DEXAMETHASONE SODIUM PHOSPHATE 4 MG: 10 INJECTION INTRAMUSCULAR; INTRAVENOUS at 07:17

## 2024-02-07 RX ADMIN — ACETAMINOPHEN 1000 MG: 500 TABLET, FILM COATED ORAL at 15:05

## 2024-02-07 RX ADMIN — PHENYLEPHRINE HYDROCHLORIDE 100 MCG: 0.1 INJECTION, SOLUTION INTRAVENOUS at 07:18

## 2024-02-07 RX ADMIN — FENTANYL CITRATE 50 MCG: 50 INJECTION, SOLUTION INTRAMUSCULAR; INTRAVENOUS at 08:27

## 2024-02-07 RX ADMIN — FAMOTIDINE 20 MG: 20 TABLET, FILM COATED ORAL at 05:58

## 2024-02-07 RX ADMIN — SODIUM CHLORIDE, SODIUM LACTATE, POTASSIUM CHLORIDE, AND CALCIUM CHLORIDE: 600; 310; 30; 20 INJECTION, SOLUTION INTRAVENOUS at 08:28

## 2024-02-07 RX ADMIN — ONDANSETRON 10 MG: 2 INJECTION INTRAMUSCULAR; INTRAVENOUS at 07:17

## 2024-02-07 RX ADMIN — TRANEXAMIC ACID 1300 MG: 650 TABLET ORAL at 15:05

## 2024-02-07 RX ADMIN — OXYCODONE 10 MG: 5 TABLET ORAL at 08:58

## 2024-02-07 RX ADMIN — MIDAZOLAM 1 MG: 1 INJECTION INTRAMUSCULAR; INTRAVENOUS at 07:00

## 2024-02-07 RX ADMIN — Medication 2000 MG: at 07:02

## 2024-02-07 RX ADMIN — FENTANYL CITRATE 25 MCG: 50 INJECTION, SOLUTION INTRAMUSCULAR; INTRAVENOUS at 08:08

## 2024-02-07 RX ADMIN — KETOROLAC TROMETHAMINE 15 MG: 15 INJECTION, SOLUTION INTRAMUSCULAR; INTRAVENOUS at 15:05

## 2024-02-07 RX ADMIN — PHENYLEPHRINE HYDROCHLORIDE 150 MCG: 0.1 INJECTION, SOLUTION INTRAVENOUS at 07:07

## 2024-02-07 RX ADMIN — OXYCODONE HYDROCHLORIDE 10 MG: 5 TABLET ORAL at 15:05

## 2024-02-07 RX ADMIN — MIDAZOLAM 1 MG: 1 INJECTION INTRAMUSCULAR; INTRAVENOUS at 06:50

## 2024-02-07 RX ADMIN — MIDAZOLAM 2 MG: 1 INJECTION INTRAMUSCULAR; INTRAVENOUS at 06:37

## 2024-02-07 RX ADMIN — FENTANYL CITRATE 50 MCG: 50 INJECTION INTRAMUSCULAR; INTRAVENOUS at 06:37

## 2024-02-07 RX ADMIN — SODIUM CHLORIDE, SODIUM LACTATE, POTASSIUM CHLORIDE, AND CALCIUM CHLORIDE: 600; 310; 30; 20 INJECTION, SOLUTION INTRAVENOUS at 06:28

## 2024-02-07 RX ADMIN — FENTANYL CITRATE 25 MCG: 50 INJECTION, SOLUTION INTRAMUSCULAR; INTRAVENOUS at 06:50

## 2024-02-07 RX ADMIN — ACETAMINOPHEN 1000 MG: 500 TABLET, FILM COATED ORAL at 05:59

## 2024-02-07 RX ADMIN — PROPOFOL 75 MCG/KG/MIN: 10 INJECTION, EMULSION INTRAVENOUS at 07:02

## 2024-02-07 RX ADMIN — VANCOMYCIN HYDROCHLORIDE 1500 MG: 10 INJECTION, POWDER, LYOPHILIZED, FOR SOLUTION INTRAVENOUS at 06:26

## 2024-02-07 RX ADMIN — TRANEXAMIC ACID 1000 MG: 100 INJECTION, SOLUTION INTRAVENOUS at 08:03

## 2024-02-07 RX ADMIN — PHENYLEPHRINE HYDROCHLORIDE 100 MCG: 0.1 INJECTION, SOLUTION INTRAVENOUS at 07:58

## 2024-02-07 ASSESSMENT — KOOS JR
RISING FROM SITTING: 2
HOW SEVERE IS YOUR KNEE STIFFNESS AFTER FIRST WAKING IN MORNING: 2
GOING UP OR DOWN STAIRS: 2
KOOS JR TOTAL INTERVAL SCORE: 52.465
BENDING TO THE FLOOR TO PICK UP OBJECT: 2
TWISING OR PIVOTING ON KNEE: 2
STRAIGHTENING KNEE FULLY: 2
STANDING UPRIGHT: 2

## 2024-02-07 ASSESSMENT — PAIN - FUNCTIONAL ASSESSMENT: PAIN_FUNCTIONAL_ASSESSMENT: 0-10

## 2024-02-07 ASSESSMENT — PAIN DESCRIPTION - DESCRIPTORS
DESCRIPTORS: ACHING
DESCRIPTORS: ACHING

## 2024-02-07 ASSESSMENT — PAIN SCALES - GENERAL
PAINLEVEL_OUTOF10: 0
PAINLEVEL_OUTOF10: 8
PAINLEVEL_OUTOF10: 8

## 2024-02-07 ASSESSMENT — PAIN DESCRIPTION - LOCATION: LOCATION: KNEE

## 2024-02-07 ASSESSMENT — PAIN DESCRIPTION - ORIENTATION: ORIENTATION: POSTERIOR

## 2024-02-07 ASSESSMENT — PAIN DESCRIPTION - PAIN TYPE: TYPE: SURGICAL PAIN

## 2024-02-07 NOTE — PERIOP NOTE
TRANSFER - OUT REPORT:    Verbal report given to Kita AMIN on Sierra Grace  being transferred to Room 331 for routine progression of patient care       Report consisted of patient's Situation, Background, Assessment and   Recommendations(SBAR).     Information from the following report(s) Nurse Handoff Report, Adult Overview, and MAR was reviewed with the receiving nurse.           Lines:   Peripheral IV 02/07/24 Posterior;Right Antecubital (Active)   Site Assessment Clean, dry & intact 02/07/24 0903   Line Status Infusing 02/07/24 0903   Line Care Connections checked and tightened 02/07/24 0903   Phlebitis Assessment No symptoms 02/07/24 0903   Infiltration Assessment 0 02/07/24 0903   Dressing Status Clean, dry & intact 02/07/24 0903   Dressing Type Transparent 02/07/24 0903        Opportunity for questions and clarification was provided.      Patient transported with:  O2 @ 0lpm

## 2024-02-07 NOTE — PROGRESS NOTES
Dr. Jarquin reviewed chart. No order received. Ok to proceed.    
Had therapy. Has voided. Has prescriptions for home already. Home health arranged for therapy. Has follow up appt scheduled. Discharge instructions given. Going to car via wheelchair.  
OCCUPATIONAL THERAPY Initial Assessment, Daily Note, and PM      (Link to Caseload Tracking: OT Visit Days: 1  OT Orders   Time  OT Charge Capture  Rehab Caseload Tracker  Episode     Sierra Grace is a 59 y.o. female   PRIMARY DIAGNOSIS: Osteoarthritis of left knee, unspecified osteoarthritis type  Primary osteoarthritis of left knee [M17.12]  Acquired deformity of left knee [M21.962]  Osteoarthritis of left knee, unspecified osteoarthritis type [M17.12]  Procedure(s) (LRB):  LEFT KNEE TOTAL ARTHROPLASTY ROBOTIC, Ramy/TRAVIS, Prevena, Vanc, Cellerate, Tobra/ SDD (Left)  Day of Surgery  Reason for Referral: Pain in Left Knee (M25.562)  Stiffness of Left Knee, Not elsewhere classified (M25.662)  Generalized Muscle Weakness (M62.81)  Other lack of cordination (R27.8)  Difficulty in walking, Not elsewhere classified (R26.2)  Other abnormalities of gait and mobility (R26.89)  Outpatient in a bed: Payor: [employer] / [place] / Product Type: *No Product type* /     ASSESSMENT:     REHAB RECOMMENDATIONS:   Recommendation to date pending progress:  Setting:  No further skilled occupational therapy after discharge from hospital    Equipment:    Rolling Walker     ASSESSMENT:  Ms. Grace is s/p left TKA with small wound vac and presents with decreased independence with functional mobility and activities of daily living as compared to baseline level of function and safety. Patient would benefit from skilled Occupational Therapy to maximize independence and safety with self-care task and functional mobility.   Patient supine in bed CGA supine to sit. She ambulated to the restroom with CGA. She toileted and donned a clean brief with cga. She returned to recliner  and dressed with min assist. She was set up in her recliner with her ice machine. She plans to discharge home today with good support from spouse and family. She was educated on OT poc, discharge planning, aDL task performance and post op showering. Wound vac 
TRANSFER - IN REPORT:    Verbal report received from Bonita Goldman RN on Sierra Grace  being received from PACU for routine post-op      Report consisted of patient's Situation, Background, Assessment and   Recommendations(SBAR).     Information from the following report(s) Nurse Handoff Report was reviewed with the receiving nurse.    Opportunity for questions and clarification was provided.      Assessment completed upon patient's arrival to unit and care assumed.   Oriented to room and bed controls. Prevena to L knee with iceman. Yellow gripper socks to feet. Moving feet and has sensation.  
    TIME IN/OUT:  Time In: 1355  Time Out: 1435  Minutes: 40    Sarah Maldonado, PT

## 2024-02-07 NOTE — DISCHARGE INSTRUCTIONS
a while before you sit again. If you must sit for a long time, prop up your leg with a chair or footstool. This will help you avoid swelling.     Ask your doctor when you can drive again. It may take several weeks after knee replacement surgery before it's safe for you to drive.     When you get into a car, sit on the edge of the seat. Then pull in your legs, and turn to face the front.     You should be able to do many everyday activities 3 to 6 weeks after your surgery. You will probably need to take 4 to 16 weeks off from work. When you can go back to work depends on the type of work you do and how you feel.     Ask your doctor when it is okay for you to have sex.     For 12 weeks, do not lift anything heavier than 10 pounds and do not lift weights.   Diet    By the time you leave the hospital, you should be eating your normal diet. If your stomach is upset, try bland, low-fat foods like plain rice, broiled chicken, toast, and yogurt. Your doctor may suggest that you take iron and vitamin supplements.     Drink plenty of fluids (unless your doctor tells you not to).     Eat healthy foods, and watch your portion sizes. Try to stay at your ideal weight. Too much weight puts more stress on your new knee.     You may notice that your bowel movements are not regular right after your surgery. This is common. Try to avoid constipation and straining with bowel movements. Drinking enough fluids, taking a stool softener, and eating foods that are good sources of fiber can help you avoid constipation. If you have not had a bowel movement after a couple of days, talk to your doctor.   Medicines    Your doctor will tell you if and when you can restart your medicines. You will also get instructions about taking any new medicines.     If you stopped taking aspirin or some other blood thinner, your doctor will tell you when to start taking it again.     Your doctor may give you a blood-thinning medicine to prevent blood clots.

## 2024-02-07 NOTE — OP NOTE
prosthesis. During this time we removed the previously placed femoral and tibial sensors and array pins and finished injection our periarticular cocktail. The wound was irrigated with normal saline again before closure.    Prior to capsular closure one gram of vancomycin powder and 1.2 grams of tobramycin powder was placed within the capsular layer. The capsular layer was closed using a combination of 0-Stratafix bidirectional barbed suture and #2 Fiberwire. One batch of Cellerate collagen powder was placed between the capsular layer closure and the subcutaneous layer closure.  The subcutaneous layer was closed using a 2-0 Monocril interrupted suture. The skin was closed using staples. A sterile Prevena dressing was applied. The sponge count and needle counts were correct. Patient was transferred to the hospital stretcher and transported to recovery in stable condition.    Signed By: Ebenezer Aguilar MD

## 2024-02-07 NOTE — RT PROTOCOL NOTE
frequently as needed for the individual patient.     II. Purpose:     To provide a process that will allow for ongoing assessment and care plan modification for patients receiving respiratory services based on both objective and or subjective patient responses to interventions. This process of protocol utilization will assist in patient care progression while eliminating the need for the physician to continually update respiratory therapy orders.   To assure continuity of respiratory care that meets [facility] Clinical Practice Guidelines.    III. Initiation:  Implement Respiratory Care Protocols for patients who are ordered by physician          to receive respiratory therapy procedures or for ventilator management.     IV. Protocol:  Upon receiving an order for therapy the RCP will review the patient's EMR (electronic medical record) for all pertinent information including:  Physician's order for therapy  Patient history and physical examination  Physician progress notes  Diagnostic. X-rays, PFT's, arterial blood gases etc.  The RCP will perform a respiratory assessment in the following manner:  General observations: color, pattern and effort of breathing, chest expansion, (symmetrical and bilateral), level of consciousness and the ability to ambulate.  The RCP will assess patient's cough ability and determine if bronchial hygiene is needed. If patient is unable to produce sputum, at that time, the RCP should question the patient with regard to their sputum: production, color consistency, frequency and amount.  Auscultation: Using a stethoscope, the RCP will listen and note quality of breath sounds and presence or absence of adventitious breath sounds in all lung fields, both anteriorly and posteriorly.  Upon completing the EMR review and physical assessment, the RCP will document findings in the “RT Assessment section” of the EMR. The score level will be provided and will be used to determine the frequency of

## 2024-02-07 NOTE — ANESTHESIA POSTPROCEDURE EVALUATION
Department of Anesthesiology  Postprocedure Note    Patient: Sierra Grace  MRN: [id number]  YOB: 1965  Date of evaluation: 2/7/2024    Procedure Summary       Date: 02/07/24 Room / Location: [facility] MA[facility] OR 06 / E MAIN OR    Anesthesia Start: 0645 Anesthesia Stop: 0839    Procedure: LEFT KNEE TOTAL ARTHROPLASTY ROBOTIC, Ramy/TRAVIS, Prevena, Vanc, Cellerate, Tobra/ SDD (Left: Knee) Diagnosis:       Primary osteoarthritis of left knee      Acquired deformity of left knee      (Primary osteoarthritis of left knee [M17.12])      (Acquired deformity of left knee [M21.962])    Surgeons: Ebenezer Aguilar MD Responsible Provider: KIKA Monahan MD    Anesthesia Type: spinal ASA Status: 3            Anesthesia Type: No value filed.    Linda Phase I: Linda Score: 10    Linda Phase II:      Anesthesia Post Evaluation    Patient location during evaluation: PACU  Patient participation: complete - patient participated  Level of consciousness: awake and alert  Airway patency: patent  Nausea & Vomiting: no nausea and no vomiting  Cardiovascular status: hemodynamically stable  Respiratory status: acceptable, nonlabored ventilation and spontaneous ventilation  Hydration status: euvolemic  Comments: BP (!) 145/83   Pulse 78   Temp 97.7 °F (36.5 °C) (Oral)   Resp 16   Ht 1.6 m (5' 2.99\")   Wt 96.5 kg (212 lb 11.9 oz)   SpO2 97%   BMI 37.70 kg/m²     Multimodal analgesia pain management approach  Pain management: adequate and satisfactory to patient    No notable events documented.

## 2024-02-07 NOTE — ANESTHESIA PROCEDURE NOTES
findings.  Ultrasound imaged printed and placed on chart.    Medications Administered  ropivacaine (NAROPIN) injection 0.2% - Perineural   20 mL - 2/7/2024 6:36:00 AM

## 2024-02-07 NOTE — H&P
Topics    Alcohol use: No         Allergies:      Allergies   Allergen Reactions    Penicillins Hives        Vitals:   BP (!) 151/83   Pulse 72   Temp 97 °F (36.1 °C) (Temporal)   Resp 18   Ht 1.6 m (5' 2.99\")   Wt 96.5 kg (212 lb 11.9 oz)   SpO2 100%   BMI 37.70 kg/m²       Objective:         General: No Acute distress                   HEENT: Normocephalic/atramatic                   Lungs:  Breathing non-labored                   Heart:   RRR                    Abdomen: soft       Extremities:  Prior exam done in office has been consistent with end-stage knee arthritis. The patient has noted pain with ROM of the left knee. Trace effusion. Crepitus   present. Distally the patient shows no neurologic deficit. Antalgic gait appreciated.     Meds:   Current Facility-Administered Medications   Medication Dose Route Frequency    lidocaine 1 % injection 1 mL  1 mL IntraDERmal Once PRN    fentaNYL (SUBLIMAZE) injection 100 mcg  100 mcg IntraVENous Once PRN    lactated ringers IV soln infusion   IntraVENous Continuous    sodium chloride flush 0.9 % injection 5-40 mL  5-40 mL IntraVENous 2 times per day    sodium chloride flush 0.9 % injection 5-40 mL  5-40 mL IntraVENous PRN    0.9 % sodium chloride infusion   IntraVENous PRN    midazolam PF (VERSED) injection 2 mg  2 mg IntraVENous Once PRN    ceFAZolin (ANCEF) 2000 mg in sterile water 20 mL IV syringe  2,000 mg IntraVENous On Call to OR    sodium chloride flush 0.9 % injection 5-40 mL  5-40 mL IntraVENous 2 times per day    sodium chloride flush 0.9 % injection 5-40 mL  5-40 mL IntraVENous PRN    0.9 % sodium chloride infusion   IntraVENous PRN    vancomycin (VANCOCIN) 1500 mg in sodium chloride 0.9 % 250 mL IVPB  1,500 mg IntraVENous Once       Patient Active Problem List   Diagnosis    Obesity (BMI 30-39.9)    HIV (human immunodeficiency virus infection) (HCC)    Hypersomnia    CASSY (obstructive sleep apnea)    Menopause    Primary osteoarthritis of left knee

## 2024-02-07 NOTE — ANESTHESIA PRE PROCEDURE
GI/Hepatic/Renal:   (+) morbid obesity          Endo/Other:                     Abdominal:             Vascular:          Other Findings:       Anesthesia Plan      spinal     ASA 3           MIPS: Prophylactic antiemetics administered.  Anesthetic plan and risks discussed with patient.              Post-op pain plan if not by surgeon: single peripheral nerve block        KIKA BUSTAMANTE MD   2/7/2024

## 2024-02-07 NOTE — PERIOP NOTE
Pt VS stable. Pain and nausea controlled at this time. Verbal signout per MD Monahan when PACU care is completed. Plan of care continues.

## 2024-02-07 NOTE — CARE COORDINATION
Patient is a 59 y.o. year old female admitted for Left TKA . Patient plans to return home on discharge. Order received to arrange home health. Patient without preference towards agency. Referral sent to [facility].  Patient requesting we arrange a walker and bedside commode. Pt without preference towards provider. Referral sent to [facility]. Equipment delivered to the hospital room prior to discharge. Will follow until discharge.      02/07/24 9223   Service Assessment   Patient Orientation Alert and Oriented   Cognition Alert   History Provided By Patient   Services At/After Discharge   Transition of Care Consult (CM Consult) Home Health   Internal Home Health Yes   Services At/After Discharge Home Health;PT   Mode of Transport at Discharge Self   Confirm Follow Up Transport Self   Condition of Participation: Discharge Planning   The Plan for Transition of Care is related to the following treatment goals: improve mobility   The Patient and/or Patient Representative was provided with a Choice of Provider? Patient   The Patient and/Or Patient Representative agree with the Discharge Plan? Yes   Freedom of Choice list was provided with basic dialogue that supports the patient's individualized plan of care/goals, treatment preferences, and shares the quality data associated with the providers?  Yes

## 2024-02-08 ENCOUNTER — HOME CARE VISIT (OUTPATIENT)
Dept: SCHEDULING | Facility: HOME HEALTH | Age: 59
End: 2024-02-08
Payer: COMMERCIAL

## 2024-02-08 VITALS
DIASTOLIC BLOOD PRESSURE: 86 MMHG | OXYGEN SATURATION: 98 % | HEART RATE: 78 BPM | RESPIRATION RATE: 18 BRPM | SYSTOLIC BLOOD PRESSURE: 140 MMHG | TEMPERATURE: 98 F

## 2024-02-08 PROCEDURE — G0151 HHCP-SERV OF PT,EA 15 MIN: HCPCS

## 2024-02-08 ASSESSMENT — ENCOUNTER SYMPTOMS
PAIN LOCATION - PAIN QUALITY: ACHE, SHARP
CONSTIPATION: 1

## 2024-02-14 ENCOUNTER — HOME CARE VISIT (OUTPATIENT)
Dept: SCHEDULING | Facility: HOME HEALTH | Age: 59
End: 2024-02-14
Payer: COMMERCIAL

## 2024-02-14 VITALS
RESPIRATION RATE: 18 BRPM | SYSTOLIC BLOOD PRESSURE: 122 MMHG | OXYGEN SATURATION: 98 % | DIASTOLIC BLOOD PRESSURE: 74 MMHG | TEMPERATURE: 97.2 F | HEART RATE: 74 BPM

## 2024-02-14 PROCEDURE — G0151 HHCP-SERV OF PT,EA 15 MIN: HCPCS

## 2024-02-16 ENCOUNTER — HOME CARE VISIT (OUTPATIENT)
Dept: SCHEDULING | Facility: HOME HEALTH | Age: 59
End: 2024-02-16
Payer: COMMERCIAL

## 2024-02-16 VITALS
SYSTOLIC BLOOD PRESSURE: 130 MMHG | HEART RATE: 89 BPM | TEMPERATURE: 97.3 F | DIASTOLIC BLOOD PRESSURE: 66 MMHG | RESPIRATION RATE: 19 BRPM

## 2024-02-16 PROCEDURE — G0157 HHC PT ASSISTANT EA 15: HCPCS

## 2024-02-20 ENCOUNTER — HOME CARE VISIT (OUTPATIENT)
Dept: SCHEDULING | Facility: HOME HEALTH | Age: 59
End: 2024-02-20
Payer: COMMERCIAL

## 2024-02-20 VITALS
RESPIRATION RATE: 17 BRPM | OXYGEN SATURATION: 98 % | DIASTOLIC BLOOD PRESSURE: 72 MMHG | TEMPERATURE: 98 F | SYSTOLIC BLOOD PRESSURE: 128 MMHG | HEART RATE: 72 BPM

## 2024-02-20 DIAGNOSIS — Z96.652 STATUS POST LEFT KNEE REPLACEMENT: Primary | ICD-10-CM

## 2024-02-20 PROCEDURE — G0157 HHC PT ASSISTANT EA 15: HCPCS

## 2024-02-20 ASSESSMENT — ENCOUNTER SYMPTOMS: PAIN LOCATION - PAIN QUALITY: SORE, ACHY

## 2024-02-21 ENCOUNTER — CLINICAL DOCUMENTATION (OUTPATIENT)
Dept: ORTHOPEDIC SURGERY | Age: 59
End: 2024-02-21

## 2024-02-22 ENCOUNTER — HOME CARE VISIT (OUTPATIENT)
Dept: SCHEDULING | Facility: HOME HEALTH | Age: 59
End: 2024-02-22
Payer: COMMERCIAL

## 2024-02-22 ENCOUNTER — TELEPHONE (OUTPATIENT)
Dept: ORTHOPEDIC SURGERY | Age: 59
End: 2024-02-22

## 2024-02-22 VITALS
DIASTOLIC BLOOD PRESSURE: 88 MMHG | OXYGEN SATURATION: 98 % | TEMPERATURE: 98 F | RESPIRATION RATE: 17 BRPM | SYSTOLIC BLOOD PRESSURE: 138 MMHG

## 2024-02-22 PROCEDURE — G0157 HHC PT ASSISTANT EA 15: HCPCS

## 2024-02-22 ASSESSMENT — ENCOUNTER SYMPTOMS: PAIN LOCATION - PAIN QUALITY: SORE, ACHY

## 2024-02-27 ENCOUNTER — HOME CARE VISIT (OUTPATIENT)
Dept: SCHEDULING | Facility: HOME HEALTH | Age: 59
End: 2024-02-27
Payer: COMMERCIAL

## 2024-02-27 VITALS
RESPIRATION RATE: 17 BRPM | DIASTOLIC BLOOD PRESSURE: 88 MMHG | HEART RATE: 92 BPM | TEMPERATURE: 98.2 F | SYSTOLIC BLOOD PRESSURE: 148 MMHG | OXYGEN SATURATION: 98 %

## 2024-02-27 PROCEDURE — G0157 HHC PT ASSISTANT EA 15: HCPCS

## 2024-02-27 ASSESSMENT — ENCOUNTER SYMPTOMS: PAIN LOCATION - PAIN QUALITY: SORE

## 2024-02-28 ENCOUNTER — CLINICAL DOCUMENTATION (OUTPATIENT)
Dept: ORTHOPEDIC SURGERY | Age: 59
End: 2024-02-28

## 2024-02-29 ENCOUNTER — OFFICE VISIT (OUTPATIENT)
Dept: ORTHOPEDIC SURGERY | Age: 59
End: 2024-02-29

## 2024-02-29 DIAGNOSIS — G89.29 CHRONIC PAIN OF LEFT KNEE: Primary | ICD-10-CM

## 2024-02-29 DIAGNOSIS — M25.562 CHRONIC PAIN OF LEFT KNEE: Primary | ICD-10-CM

## 2024-02-29 DIAGNOSIS — Z09 FOLLOW-UP EXAMINATION FOLLOWING SURGERY: ICD-10-CM

## 2024-02-29 DIAGNOSIS — M25.562 LEFT KNEE PAIN, UNSPECIFIED CHRONICITY: ICD-10-CM

## 2024-02-29 RX ORDER — CLINDAMYCIN HYDROCHLORIDE 300 MG/1
CAPSULE ORAL
Qty: 6 CAPSULE | Refills: 1 | Status: SHIPPED | OUTPATIENT
Start: 2024-02-29

## 2024-02-29 NOTE — PROGRESS NOTES
Will continue to work with PT/OT transitioning from home health PT to outpatient physical therapy.  I have asked the patient not to submerge the incision under water for another couple of weeks and refrain from placing any creams or oils over the incision.  If needed they are given a prescription for a refill on their pain medication today.  I will plan to check them back in 3 months for a routine 4 month follow-up.  However, if ROM is less than 105 degrees then I would like for patient to return in 6 weeks to reassess their ROM.   Patient is scheduled for right TKA on 5/6.      Signed By: JON HILLMAN, APRN - CNP  February 29, 2024

## 2024-03-01 ENCOUNTER — HOME CARE VISIT (OUTPATIENT)
Dept: SCHEDULING | Facility: HOME HEALTH | Age: 59
End: 2024-03-01
Payer: COMMERCIAL

## 2024-03-01 VITALS
TEMPERATURE: 98 F | SYSTOLIC BLOOD PRESSURE: 128 MMHG | RESPIRATION RATE: 18 BRPM | OXYGEN SATURATION: 98 % | DIASTOLIC BLOOD PRESSURE: 84 MMHG | HEART RATE: 78 BPM

## 2024-03-01 PROCEDURE — G0151 HHCP-SERV OF PT,EA 15 MIN: HCPCS

## 2024-03-01 ASSESSMENT — ENCOUNTER SYMPTOMS: PAIN LOCATION - PAIN QUALITY: ACHE

## 2024-03-05 ENCOUNTER — TELEPHONE (OUTPATIENT)
Dept: ORTHOPEDIC SURGERY | Age: 59
End: 2024-03-05

## 2024-03-05 NOTE — TELEPHONE ENCOUNTER
I returned a call to [facility] and was placed on hold. If they return the call, the patient must wait 6 weeks before having any dental procedures. The patient will also need to take an antibiotic prior to an appointment.

## 2024-03-05 NOTE — TELEPHONE ENCOUNTER
Yolanda I have the Dentist Dr Austin Gonzalez that want to know how long does pt have to wait to have dental treatment please advise [facility] [phone]

## 2024-03-06 ENCOUNTER — EVALUATION (OUTPATIENT)
Age: 59
End: 2024-03-06

## 2024-03-06 DIAGNOSIS — M25.662 KNEE STIFFNESS, LEFT: Primary | ICD-10-CM

## 2024-03-06 DIAGNOSIS — M25.562 ACUTE PAIN OF LEFT KNEE: ICD-10-CM

## 2024-03-06 DIAGNOSIS — M62.81 MUSCLE WEAKNESS: ICD-10-CM

## 2024-03-06 DIAGNOSIS — M25.462 EFFUSION OF LEFT KNEE: ICD-10-CM

## 2024-03-06 DIAGNOSIS — Z96.652 STATUS POST LEFT KNEE REPLACEMENT: ICD-10-CM

## 2024-03-06 DIAGNOSIS — R26.2 DIFFICULTY WALKING: ICD-10-CM

## 2024-03-06 NOTE — PROGRESS NOTES
ADLs  (18992) Manual therapy techniques to improve joint and/or soft tissue mobility, ROM, and function as well as helping to decrease pain/spasms and swelling  Home exercise program (HEP) development  Modalities prn to address pain, spasms, and swelling: (04866) Vasopneumatic compression  (92167) Hot/cold pack    The referring medical provider has reviewed and approved this evaluation and plan of care as noted by the electronic signature attached to note.    GOALS     Goals:  Short term goals to be met by 4/3/2024  (4 weeks):  Pt will demonstrate good recall of HEP requiring minimal verbal cuing for proper form and technique.  Pt will decrease swelling at involved LE/knee by 1 cm at mid-patella, which contributes to greater ROM and less pain with ADLs.  Increase knee AROM of involved LE to 105 degrees, in order to sit more comfortably.  Pt will demonstrate independent ambulation without device on even terrain for household distances.  Pt will ascend steps with a reciprocal pattern and descend with step to gait using rails for balance only, modified independent.  Improve LEFS to = 58/80, showing gains in ADLs and daily tasks.   Pt will perform independent supine SLR without lag x 30 reps.    Long term goals to be met by 6/4/2024 (90 days):  Pt will be compliant and independent with a comprehensive HEP and activity progression.   Demonstrate AROM of involved knee to be 115 degrees, allowing for ADLs with min restrictions related to knee stiffness.    Pt will increase LE strength to at least 4+/5 with MMT for improved maximal stability during gait.   Pt to walk without device, demonstrating good stability when faced with obstacles to allow for safe community ambulation.  Pt will independently ascend and descend steps with reciprocal pattern demonstrating good control and balance using rails only for balance.   Improve LEFS to 63/80 demonstrating min functional restrictions with ADLs, work and athletic activities.

## 2024-03-08 ENCOUNTER — TREATMENT (OUTPATIENT)
Age: 59
End: 2024-03-08

## 2024-03-08 ENCOUNTER — TELEPHONE (OUTPATIENT)
Age: 59
End: 2024-03-08

## 2024-03-08 DIAGNOSIS — M25.662 KNEE STIFFNESS, LEFT: Primary | ICD-10-CM

## 2024-03-08 DIAGNOSIS — M25.462 EFFUSION OF LEFT KNEE: ICD-10-CM

## 2024-03-08 DIAGNOSIS — M62.81 MUSCLE WEAKNESS: ICD-10-CM

## 2024-03-08 DIAGNOSIS — M25.562 ACUTE PAIN OF LEFT KNEE: ICD-10-CM

## 2024-03-08 DIAGNOSIS — R26.2 DIFFICULTY WALKING: ICD-10-CM

## 2024-03-08 NOTE — PROGRESS NOTES
[facility]  [address] [place] [id number]  Dept: [phone]      Physical Therapy Daily Note     Referring MD: No ref. provider found  Diagnosis:     ICD-10-CM    1. Knee stiffness, left  M25.662       2. Acute pain of left knee  M25.562       3. Muscle weakness  M62.81       4. Difficulty walking  R26.2       5. Effusion of left knee  M25.462          Surgery: LEFT KNEE TOTAL ARTHROPLASTY ROBOTIC  Date: 2/7/2024    Therapy precautions: blood bourne illness  Co-morbidities affecting plan of care: Obesity, HIV, R TKA schedule 5/3/24, umbilical hernia   Patient Stated Goals: walk without cane, strong enough for R TKA  Total Timed Procedure Codes: 40 min, Total Time: 55 min Modifier needed: No  Episode visit count:  2     SUBJECTIVE     Pt reports that she iced the knee earlier today to get ready for therapy. She has been working on bending the knee.     Medications: no changes since last session    OBJECTIVE       Findings 3/6/2024  3/8/2024        Knee extension AROM 0° 0°    Knee flexion AROM 70° 102°      Unable to perform a SLR as of 3/8/24.    Treatment provided today:  Therapeutic exercise (59004) x 40 min to develop ROM, strength, endurance and flexibility of the LLE.  Nustep level 3 x 5 min  L knee flexion stretch on second step H10sec x 10  Long arc quad H5sec x 10 L  Heel slides w/ slider and strap H10sec x 10  Quad set H5sec x 20  PROM L knee flexion  in sitting and supine, and extension in supine  Heel slides + DF at end range  Short arc quads 2x10  Vasopneumatic Compression (52828) with cold x 15 minutes: to L knee in order to reduce inflammation and swelling/joint effusion which will help improve ROM and manage pain.     ASSESSMENT     L knee flexion AROM markedly improved from evaluation. Pt is highly motivated to improve and progressing well with therapy. Quad strength remains limited and pt is unable to perform a SLR.     PLAN     Continue with knee flexion

## 2024-03-11 ENCOUNTER — TREATMENT (OUTPATIENT)
Age: 59
End: 2024-03-11
Payer: COMMERCIAL

## 2024-03-11 DIAGNOSIS — M62.81 MUSCLE WEAKNESS: ICD-10-CM

## 2024-03-11 DIAGNOSIS — M25.462 EFFUSION OF LEFT KNEE: ICD-10-CM

## 2024-03-11 DIAGNOSIS — M25.662 KNEE STIFFNESS, LEFT: Primary | ICD-10-CM

## 2024-03-11 DIAGNOSIS — R26.2 DIFFICULTY WALKING: ICD-10-CM

## 2024-03-11 DIAGNOSIS — M25.562 ACUTE PAIN OF LEFT KNEE: ICD-10-CM

## 2024-03-11 PROCEDURE — 97530 THERAPEUTIC ACTIVITIES: CPT | Performed by: PHYSICAL THERAPIST

## 2024-03-11 PROCEDURE — 97140 MANUAL THERAPY 1/> REGIONS: CPT | Performed by: PHYSICAL THERAPIST

## 2024-03-11 PROCEDURE — 97016 VASOPNEUMATIC DEVICE THERAPY: CPT | Performed by: PHYSICAL THERAPIST

## 2024-03-11 PROCEDURE — 97110 THERAPEUTIC EXERCISES: CPT | Performed by: PHYSICAL THERAPIST

## 2024-03-11 NOTE — PROGRESS NOTES
Stretch  - 1 x daily - 4-5 x weekly - 1 sets - 4 reps - 30 hold  - Seated Knee Flexion Stretch  - 2 x daily - 1 sets - 4 reps - 30 hold

## 2024-03-13 ENCOUNTER — TREATMENT (OUTPATIENT)
Age: 59
End: 2024-03-13
Payer: COMMERCIAL

## 2024-03-13 DIAGNOSIS — M25.462 EFFUSION OF LEFT KNEE: ICD-10-CM

## 2024-03-13 DIAGNOSIS — M25.662 KNEE STIFFNESS, LEFT: Primary | ICD-10-CM

## 2024-03-13 DIAGNOSIS — M25.562 ACUTE PAIN OF LEFT KNEE: ICD-10-CM

## 2024-03-13 DIAGNOSIS — R26.2 DIFFICULTY WALKING: ICD-10-CM

## 2024-03-13 DIAGNOSIS — M62.81 MUSCLE WEAKNESS: ICD-10-CM

## 2024-03-13 PROCEDURE — 97110 THERAPEUTIC EXERCISES: CPT | Performed by: PHYSICAL THERAPIST

## 2024-03-13 PROCEDURE — 97140 MANUAL THERAPY 1/> REGIONS: CPT | Performed by: PHYSICAL THERAPIST

## 2024-03-13 PROCEDURE — 97530 THERAPEUTIC ACTIVITIES: CPT | Performed by: PHYSICAL THERAPIST

## 2024-03-13 NOTE — PROGRESS NOTES
[facility]  [address] [place] [id number]  Dept: [phone]      Physical Therapy Daily Note     Referring MD: Ebenezer Aguilar MD  Diagnosis:     ICD-10-CM    1. Knee stiffness, left  M25.662       2. Acute pain of left knee  M25.562       3. Muscle weakness  M62.81       4. Difficulty walking  R26.2       5. Effusion of left knee  M25.462              Surgery: LEFT KNEE TOTAL ARTHROPLASTY ROBOTIC  Date: 2/7/2024    Therapy precautions: blood bourne illness  Co-morbidities affecting plan of care: Obesity, HIV, R TKA schedule 5/3/24, umbilical hernia   Patient Stated Goals: walk without cane, strong enough for R TKA    Total Timed Procedure Codes: 58 min, Total Time: 60 min Modifier needed: No  Episode visit count:  4     SUBJECTIVE     Pt reports that she is feeling OK and knee is feels looser than initially, but still complains of stiffness in the morning.  She states she was able to  the shower today, instead of sitting and drove to her appointment.    Medications: no changes since last session    OBJECTIVE   4+ weeks s/p L TKA    Findings 3/6/2024  3/11/2024     3/13/24   Knee extension AROM 0° 0° 0   Knee flexion AROM 70° 102 active with DF/ 105 with over pressure in supine 105 active and 110 with overpressure     Pt adhesions noted throughout her surgical scar were less today, and she had observable less swelling and tightness in her quad musculature. She demonstrated improved quad set and mobility in her L PFJ. Vs last visit.  Mild swelling noted throughout her L knee joint  Treatment provided today:    Manual therapy (94789) x 15 min utilizing techniques to improve joint and/or soft tissue mobility, ROM, and function as well as helping to decrease pain/spasms and swelling.  Palpation and assessment of soft tissue, muscles, and landmarks   PFJ mobilizations  STM to L quad using the stick    Therapeutic exercise (54290) x 28 min to develop ROM, strength,

## 2024-03-15 ENCOUNTER — TREATMENT (OUTPATIENT)
Age: 59
End: 2024-03-15

## 2024-03-15 DIAGNOSIS — M25.562 ACUTE PAIN OF LEFT KNEE: ICD-10-CM

## 2024-03-15 DIAGNOSIS — M62.81 MUSCLE WEAKNESS: ICD-10-CM

## 2024-03-15 DIAGNOSIS — M25.462 EFFUSION OF LEFT KNEE: ICD-10-CM

## 2024-03-15 DIAGNOSIS — M25.662 KNEE STIFFNESS, LEFT: Primary | ICD-10-CM

## 2024-03-15 DIAGNOSIS — R26.2 DIFFICULTY WALKING: ICD-10-CM

## 2024-03-15 NOTE — PROGRESS NOTES
modified independent.  Improve LEFS to = 58/80, showing gains in ADLs and daily tasks.   Pt will perform independent supine SLR without lag x 30 reps.    Long term goals to be met by 6/4/2024 (90 days):  Pt will be compliant and independent with a comprehensive HEP and activity progression.   Demonstrate AROM of involved knee to be 115 degrees, allowing for ADLs with min restrictions related to knee stiffness.    Pt will increase LE strength to at least 4+/5 with MMT for improved maximal stability during gait.   Pt to walk without device, demonstrating good stability when faced with obstacles to allow for safe community ambulation.  Pt will independently ascend and descend steps with reciprocal pattern demonstrating good control and balance using rails only for balance.   Improve LEFS to 63/80 demonstrating min functional restrictions with ADLs, work and athletic activities.     [email or web address]    Access Code: [email or web address]  URL: [email or web address]  Date: 03/06/2024  Prepared by: Mary Gardner    Exercises  - Long Sitting Quad Set with Towel Roll Under Heel  - 1 x daily - 4-5 x weekly - 3 sets - 10-15 reps - 5 hold  - Supine Knee Extension Strengthening  - 1 x daily - 4-5 x weekly - 2-3 sets - 10-15 reps - 2-5 hold  - Supine Heel Slide  - 1 x daily - 4-5 x weekly - 1-2 sets - 10-15 reps - 10 hold  - Supine Hip Adduction Isometric with Ball  - 1 x daily - 4-5 x weekly - 3 sets - 10-15 reps - 5 hold  - Seated Hamstring Stretch  - 1 x daily - 4-5 x weekly - 1 sets - 4 reps - 30 hold  - Seated Knee Flexion Stretch  - 2 x daily - 1 sets - 4 reps - 30 hold

## 2024-03-18 ENCOUNTER — TREATMENT (OUTPATIENT)
Age: 59
End: 2024-03-18
Payer: COMMERCIAL

## 2024-03-18 DIAGNOSIS — M25.662 KNEE STIFFNESS, LEFT: Primary | ICD-10-CM

## 2024-03-18 DIAGNOSIS — R26.2 DIFFICULTY WALKING: ICD-10-CM

## 2024-03-18 DIAGNOSIS — M62.81 MUSCLE WEAKNESS: ICD-10-CM

## 2024-03-18 DIAGNOSIS — M25.562 ACUTE PAIN OF LEFT KNEE: ICD-10-CM

## 2024-03-18 DIAGNOSIS — M25.462 EFFUSION OF LEFT KNEE: ICD-10-CM

## 2024-03-18 PROCEDURE — 97140 MANUAL THERAPY 1/> REGIONS: CPT | Performed by: PHYSICAL THERAPIST

## 2024-03-18 PROCEDURE — 97110 THERAPEUTIC EXERCISES: CPT | Performed by: PHYSICAL THERAPIST

## 2024-03-18 PROCEDURE — 97116 GAIT TRAINING THERAPY: CPT | Performed by: PHYSICAL THERAPIST

## 2024-03-18 NOTE — PROGRESS NOTES
[facility]  [address] [facility] [address] [facility] [address]  Dept: [phone]      Physical Therapy Daily Note     Referring MD: Ebenezer Aguilar MD  Diagnosis:     ICD-10-CM    1. Knee stiffness, left  M25.662       2. Acute pain of left knee  M25.562       3. Muscle weakness  M62.81       4. Difficulty walking  R26.2       5. Effusion of left knee  M25.462           Surgery: LEFT KNEE TOTAL ARTHROPLASTY ROBOTIC  Date: 2/7/2024    Therapy precautions: blood bourne illness  Co-morbidities affecting plan of care: Obesity, HIV, R TKA schedule 5/3/24, umbilical hernia   Patient Stated Goals: walk without cane, strong enough for R TKA    Payor: Payor: NAYANA /  /  /  Billing pattern: Other - commercial but all charges under [id number]    Total Timed Procedure Codes: 60 min, Total Time: 65 min Modifier needed: No  Episode visit count:  6/9 authorized     SUBJECTIVE     Pt states that her knee feels stiff today.    Medications: no changes since last session    OBJECTIVE   5+ weeks s/p L TKA    Findings 3/6/2024  3/11/2024     3/13/24 3/15   Knee extension AROM 0° 0° 0    Knee flexion AROM 70° 102 active with DF/ 105 with over pressure in supine 105 active and 110 with overpressure 108 A, 112 AA     Manual therapy (40573) x 15 min utilizing techniques to improve joint and/or soft tissue mobility, ROM, and function as well as helping to decrease pain/spasms and swelling.  Palpation and assessment of soft tissue, muscles, and landmarks   PFJ mobility  STM to L quads and scar tissue    Therapeutic exercise (35877) x 35 min to develop ROM, strength, endurance and flexibility of the LLE.  Nustep level 4 x 5 min w/ seat at 6  Kinesiotaping \"I\" strip going distal to proximal over healed incision using 15% tension to break up adhesions and another \"I\" strip going lateral to medial over patella using 75% tension to hold patella in better mechanical alignment promoting improved quad contraction, allowing for the

## 2024-03-20 ENCOUNTER — TREATMENT (OUTPATIENT)
Age: 59
End: 2024-03-20

## 2024-03-20 DIAGNOSIS — M25.662 KNEE STIFFNESS, LEFT: Primary | ICD-10-CM

## 2024-03-20 DIAGNOSIS — M62.81 MUSCLE WEAKNESS: ICD-10-CM

## 2024-03-20 DIAGNOSIS — M25.462 EFFUSION OF LEFT KNEE: ICD-10-CM

## 2024-03-20 DIAGNOSIS — R26.2 DIFFICULTY WALKING: ICD-10-CM

## 2024-03-20 DIAGNOSIS — M25.562 ACUTE PAIN OF LEFT KNEE: ICD-10-CM

## 2024-03-20 NOTE — PROGRESS NOTES
strengthening, gait and balance activities  On next visit: if ROM continues to progress well next week decrease to 2x/week, update HEP     GOALS     Goals:  Short term goals to be met by 4/3/2024  (4 weeks):  Pt will demonstrate good recall of HEP requiring minimal verbal cuing for proper form and technique.  Pt will decrease swelling at involved LE/knee by 1 cm at mid-patella, which contributes to greater ROM and less pain with ADLs.  Increase knee AROM of involved LE to 105 degrees, in order to sit more comfortably. Goal Met 3/15/2024   Pt will demonstrate independent ambulation without device on even terrain for household distances. Goal Met 3/15/2024   Pt will ascend steps with a reciprocal pattern and descend with step to gait using rails for balance only, modified independent.  Improve LEFS to = 58/80, showing gains in ADLs and daily tasks.   Pt will perform independent supine SLR without lag x 30 reps.    Long term goals to be met by 6/4/2024 (90 days):  Pt will be compliant and independent with a comprehensive HEP and activity progression.   Demonstrate AROM of involved knee to be 115 degrees, allowing for ADLs with min restrictions related to knee stiffness.    Pt will increase LE strength to at least 4+/5 with MMT for improved maximal stability during gait.   Pt to walk without device, demonstrating good stability when faced with obstacles to allow for safe community ambulation.  Pt will independently ascend and descend steps with reciprocal pattern demonstrating good control and balance using rails only for balance.   Improve LEFS to 63/80 demonstrating min functional restrictions with ADLs, work and athletic activities.     [employer]    Access Code: [email or web address]  URL: [email or web address]  Date: 03/06/2024  Prepared by: Mary Gardner    Exercises  - Long Sitting Quad Set with Towel Roll Under Heel  - 1 x daily - 4-5 x weekly - 3 sets - 10-15 reps - 5 hold  - Supine Knee Extension Strengthening

## 2024-03-21 NOTE — PROGRESS NOTES
swelling.  Palpation and assessment of soft tissue, muscles, and landmarks   PFJ mobility    Therapeutic exercise (59576) x 53 min to develop ROM, strength, endurance and flexibility of the LLE.  Reassessment as above  Nustep level 4 x 5 min w/ seat at 6  Kinesiotaping \"I\" strip going distal to proximal over healed incision using 15% tension to break up adhesions and another \"I\" strip going lateral to medial over patella using 75% tension to hold patella in better mechanical alignment promoting improved quad contraction, allowing for the ability to perform ADL's and functional activities, including exercise with less difficulty.  6-inch step ups x 15 L with B rails, focus on driving through quad on ascent and slow lowering with knee flexion to avoid compensation  Standing heel raises Bilateral leg press 90# 2x10 w/ focus on end range flexion stretch  Single leg press with 30# x10  L knee flexion stretch on 12\" step H10sec x 10  PROM L knee flexion/extension with pt in hooklying and in 90/90 positionSit to stand from hi/lo mat with verbal and visual cues 2x10, R foot forward  Gastroc stretch on slant board x 2 min hold        ASSESSMENT   Start of care: 3/6/2024   Progress Report Period: 3/6/24 to 3/22/24   As of 3/22/2024, Sierra Grace has attended 8 PT sessions. Pt's attendance has been consistent with plan of care.  Pt has progressed as anticipated with treatment. At this time, she has met 4/7 short term goals, has subjective reports of more pain in her R knee vs L.  Objective findings revealed dramatic improvement in L knee range since starting therapy, along with improved transfers and gait. She has weakness throughout her L LE that is evident during stair negotiation, along with decreased endurance.  Continued deficits include: Localized swelling/edema  ROM limitations  Strength deficits  Impaired transfers  Impaired gait  Decreased endurance/activity Tolerance  Limited work/occupational capacity. Pt has

## 2024-03-22 ENCOUNTER — TREATMENT (OUTPATIENT)
Age: 59
End: 2024-03-22

## 2024-03-22 DIAGNOSIS — M25.562 ACUTE PAIN OF LEFT KNEE: ICD-10-CM

## 2024-03-22 DIAGNOSIS — M62.81 MUSCLE WEAKNESS: ICD-10-CM

## 2024-03-22 DIAGNOSIS — R26.2 DIFFICULTY WALKING: ICD-10-CM

## 2024-03-22 DIAGNOSIS — M25.462 EFFUSION OF LEFT KNEE: ICD-10-CM

## 2024-03-22 DIAGNOSIS — M25.662 KNEE STIFFNESS, LEFT: Primary | ICD-10-CM

## 2024-03-25 ENCOUNTER — TREATMENT (OUTPATIENT)
Age: 59
End: 2024-03-25
Payer: COMMERCIAL

## 2024-03-25 DIAGNOSIS — M17.11 PRIMARY OSTEOARTHRITIS OF RIGHT KNEE: Primary | ICD-10-CM

## 2024-03-25 DIAGNOSIS — M25.462 EFFUSION OF LEFT KNEE: ICD-10-CM

## 2024-03-25 DIAGNOSIS — M62.81 MUSCLE WEAKNESS: ICD-10-CM

## 2024-03-25 DIAGNOSIS — M25.662 KNEE STIFFNESS, LEFT: Primary | ICD-10-CM

## 2024-03-25 DIAGNOSIS — M21.961 ACQUIRED DEFORMITY OF RIGHT KNEE: ICD-10-CM

## 2024-03-25 DIAGNOSIS — R26.2 DIFFICULTY WALKING: ICD-10-CM

## 2024-03-25 DIAGNOSIS — M25.562 ACUTE PAIN OF LEFT KNEE: ICD-10-CM

## 2024-03-25 PROCEDURE — 97140 MANUAL THERAPY 1/> REGIONS: CPT | Performed by: PHYSICAL THERAPIST

## 2024-03-25 PROCEDURE — 97110 THERAPEUTIC EXERCISES: CPT | Performed by: PHYSICAL THERAPIST

## 2024-03-25 NOTE — PROGRESS NOTES
moved to 5 after 1 min  6-inch step ups 2x10 L with B rails, focus on driving through quad on ascent and slow lowering with knee flexion to avoid compensation  Standing heel raises 2x10  Bilateral leg press 90# 2x10 w/ focus on end range flexion stretchSingle leg press with 30# 2x10  Bridges 2x10 working into increased knee flexion every 2-3 reps  Supine SLR 2x5 L  PROM L knee flexion/extension with pt in hooklying and in 90/90 position  Sit to stand from hi/lo mat  @ standard chair height, with verbal cues for slow descent 2x10, R foot forward      ASSESSMENT     Pt continues to progress slowly but steadily with ROM with patient achieving 110 degrees of active flexion today. Swelling likely contributes to limitations in ROM as well as sensation of tightness. Quad control remains limited with difficulty controlling step down.    PLAN     Progress strengthening and ROM, resume TRX squats as able    GOALS     Goals:  Short term goals to be met by 4/3/2024  (4 weeks):  Pt will demonstrate good recall of HEP requiring minimal verbal cuing for proper form and technique.- MET 3/22/24  Pt will decrease swelling at involved LE/knee by 1 cm at mid-patella, which contributes to greater ROM and less pain with ADLs.- ongoing  Increase knee AROM of involved LE to 105 degrees, in order to sit more comfortably. Goal Met 3/15/2024   Pt will demonstrate independent ambulation without device on even terrain for household distances. Goal Met 3/15/2024   Pt will ascend steps with a reciprocal pattern and descend with step to gait using rails for balance only, modified independent.-ongoing  Improve LEFS to = 58/80, showing gains in ADLs and daily tasks. - MET 3/22/24  Pt will perform independent supine SLR without lag x 30 reps.- ongoing    Long term goals to be met by 6/4/2024 (90 days):  Pt will be compliant and independent with a comprehensive HEP and activity progression.   Demonstrate AROM of involved knee to be 115 degrees,

## 2024-03-28 ENCOUNTER — TREATMENT (OUTPATIENT)
Age: 59
End: 2024-03-28
Payer: COMMERCIAL

## 2024-03-28 DIAGNOSIS — M62.81 MUSCLE WEAKNESS: ICD-10-CM

## 2024-03-28 DIAGNOSIS — M25.662 KNEE STIFFNESS, LEFT: Primary | ICD-10-CM

## 2024-03-28 DIAGNOSIS — M25.462 EFFUSION OF LEFT KNEE: ICD-10-CM

## 2024-03-28 DIAGNOSIS — M25.562 ACUTE PAIN OF LEFT KNEE: ICD-10-CM

## 2024-03-28 DIAGNOSIS — R26.2 DIFFICULTY WALKING: ICD-10-CM

## 2024-03-28 PROCEDURE — 97140 MANUAL THERAPY 1/> REGIONS: CPT | Performed by: PHYSICAL THERAPIST

## 2024-03-28 PROCEDURE — 97110 THERAPEUTIC EXERCISES: CPT | Performed by: PHYSICAL THERAPIST

## 2024-03-28 PROCEDURE — 97116 GAIT TRAINING THERAPY: CPT | Performed by: PHYSICAL THERAPIST

## 2024-04-01 ENCOUNTER — TREATMENT (OUTPATIENT)
Age: 59
End: 2024-04-01
Payer: COMMERCIAL

## 2024-04-01 ENCOUNTER — CLINICAL DOCUMENTATION (OUTPATIENT)
Dept: ORTHOPEDIC SURGERY | Age: 59
End: 2024-04-01

## 2024-04-01 DIAGNOSIS — M25.662 KNEE STIFFNESS, LEFT: Primary | ICD-10-CM

## 2024-04-01 DIAGNOSIS — M25.562 ACUTE PAIN OF LEFT KNEE: ICD-10-CM

## 2024-04-01 DIAGNOSIS — M62.81 MUSCLE WEAKNESS: ICD-10-CM

## 2024-04-01 DIAGNOSIS — R26.2 DIFFICULTY WALKING: ICD-10-CM

## 2024-04-01 PROCEDURE — 97140 MANUAL THERAPY 1/> REGIONS: CPT | Performed by: PHYSICAL THERAPIST

## 2024-04-01 PROCEDURE — 97110 THERAPEUTIC EXERCISES: CPT | Performed by: PHYSICAL THERAPIST

## 2024-04-01 NOTE — PROGRESS NOTES
HEP and activity progression.   Demonstrate AROM of involved knee to be 115 degrees, allowing for ADLs with min restrictions related to knee stiffness.    Pt will increase LE strength to at least 4+/5 with MMT for improved maximal stability during gait.   Pt to walk without device, demonstrating good stability when faced with obstacles to allow for safe community ambulation.  Pt will independently ascend and descend steps with reciprocal pattern demonstrating good control and balance using rails only for balance.   Improve LEFS to 63/80 demonstrating min functional restrictions with ADLs, work and athletic activities.     reBounces    Access Code: [email or web address]  URL: [email or web address]  Date: 03/25/2024  Prepared by: Mary Gardner    Exercises  - Long Sitting Quad Set with Towel Roll Under Heel  - 1 x daily - 4-5 x weekly - 3 sets - 10-15 reps - 5 hold  - Supine Knee Extension Strengthening  - 1 x daily - 4-5 x weekly - 2-3 sets - 10-15 reps - 2-5 hold  - Supine Heel Slide with Strap  - 2 x daily - 1 sets - 10 reps - 10 hold  - Supine Active Straight Leg Raise  - 1-2 x daily - 2 sets - 5-10 reps - 3 hold  - Beginner Bridge  - 1 x daily - 2-3 sets - 10 reps - 5 hold  - Supine Hip Adduction Isometric with Ball  - 1 x daily - 3 sets - 10-15 reps - 5 hold  - Seated Hamstring Stretch  - 1 x daily - 1 sets - 4 reps - 30 hold  - Seated Knee Flexion Stretch  - 2 x daily - 1 sets - 4 reps - 30 hold  - Seated Long Arc Quad  - 5 x weekly - 2 sets - 10 reps - 5 hold  - Sit to Stand  - 1 x daily - 1 sets - 10 reps - 5 hold

## 2024-04-03 ENCOUNTER — TREATMENT (OUTPATIENT)
Age: 59
End: 2024-04-03
Payer: COMMERCIAL

## 2024-04-03 DIAGNOSIS — R26.2 DIFFICULTY WALKING: ICD-10-CM

## 2024-04-03 DIAGNOSIS — M25.662 KNEE STIFFNESS, LEFT: Primary | ICD-10-CM

## 2024-04-03 DIAGNOSIS — M25.562 ACUTE PAIN OF LEFT KNEE: ICD-10-CM

## 2024-04-03 DIAGNOSIS — M25.462 EFFUSION OF LEFT KNEE: ICD-10-CM

## 2024-04-03 DIAGNOSIS — M62.81 MUSCLE WEAKNESS: ICD-10-CM

## 2024-04-03 PROCEDURE — 97140 MANUAL THERAPY 1/> REGIONS: CPT | Performed by: PHYSICAL THERAPIST

## 2024-04-03 PROCEDURE — 97110 THERAPEUTIC EXERCISES: CPT | Performed by: PHYSICAL THERAPIST

## 2024-04-03 NOTE — PROGRESS NOTES
[facility] PT INT [facility]  [address] [facility] [address] [facility] [address] [facility] [address]  Dept: [phone]      Physical Therapy Progress Report     Referring MD: Ebenezer Aguilar MD  Diagnosis:     ICD-10-CM    1. Knee stiffness, left  M25.662       2. Acute pain of left knee  M25.562       3. Muscle weakness  M62.81       4. Difficulty walking  R26.2       5. Effusion of left knee  M25.462             Surgery: LEFT KNEE TOTAL ARTHROPLASTY ROBOTIC  Date: 2/7/2024    Therapy precautions: blood bourne illness  Co-morbidities affecting plan of care: Obesity, HIV, R TKA schedule 5/3/24, umbilical hernia   Patient Stated Goals: walk without cane, strong enough for R TKA    Payor: Payor: NAYANA /  /  /  Billing pattern: Commercial- substantial/midpoint time each CPT   Total Timed Procedure Codes: 40 min, Total Time: 45 min Modifier needed: No  Episode visit count:  12/21 authorized through 5/20/24    SUBJECTIVE     Nature of condition: Recent onset (initial onset within last 3 months)  Describe current symptoms: Pt reports that she is walking more with less discomfort and fatigue. L knee still feels stiff, especially with sit-stand after extended sitting. Intermittent L knee pain present and described as a sharp pain.    Pain Assessment:  Average Pain/symptom intensity (0-10 scale)  Last 24 hours: 2/10  Last week (1-7 days): 2/10  How often do you feel symptoms? Occasionally (26-50%)    How much have your symptoms interfered with daily activities? A little bit  How has your condition changed since receiving care at this facility? Much better  In general, would you say your current overall health is good     Medications: no changes since last session    OBJECTIVE   6 weeks s/p L TKA    Findings 3/6/2024  4/3/2024        LEFS score 53/80 64/80    LEFS % function 66% 80%    L knee extension AROM 0° 0°    L knee flexion AROM 70° 116° w/ great effort, 111° easily    Girth mid patella L 44 cm 43.1 com    MMT 0-5      B hip flexion

## 2024-04-10 ENCOUNTER — TREATMENT (OUTPATIENT)
Age: 59
End: 2024-04-10
Payer: COMMERCIAL

## 2024-04-10 DIAGNOSIS — M25.662 KNEE STIFFNESS, LEFT: Primary | ICD-10-CM

## 2024-04-10 DIAGNOSIS — M25.462 EFFUSION OF LEFT KNEE: ICD-10-CM

## 2024-04-10 DIAGNOSIS — M25.562 ACUTE PAIN OF LEFT KNEE: ICD-10-CM

## 2024-04-10 DIAGNOSIS — R26.2 DIFFICULTY WALKING: ICD-10-CM

## 2024-04-10 DIAGNOSIS — M62.81 MUSCLE WEAKNESS: ICD-10-CM

## 2024-04-10 PROCEDURE — 97110 THERAPEUTIC EXERCISES: CPT | Performed by: PHYSICAL THERAPIST

## 2024-04-10 PROCEDURE — 97116 GAIT TRAINING THERAPY: CPT | Performed by: PHYSICAL THERAPIST

## 2024-04-10 PROCEDURE — 97140 MANUAL THERAPY 1/> REGIONS: CPT | Performed by: PHYSICAL THERAPIST

## 2024-04-10 NOTE — PROGRESS NOTES
[facility] PT INT [facility]  [address] [facility] [address]  Dept: [phone]      Physical Therapy Daily Note     Referring MD: Ebenezer Aguilar MD  Diagnosis:     ICD-10-CM    1. Knee stiffness, left  M25.662       2. Acute pain of left knee  M25.562       3. Muscle weakness  M62.81       4. Difficulty walking  R26.2       5. Effusion of left knee  M25.462           Surgery: LEFT KNEE TOTAL ARTHROPLASTY ROBOTIC  Date: 2/7/2024    Therapy precautions: blood bourne illness  Co-morbidities affecting plan of care: Obesity, HIV, R TKA schedule 5/3/24, umbilical hernia   Patient Stated Goals: walk without cane, strong enough for R TKA    Payor: Payor: NAYANA /  /  /  Billing pattern: Commercial- substantial/midpoint time each CPT   Total Timed Procedure Codes: 50 min, Total Time: 50 min Modifier needed: No  Episode visit count:  13/21 authorized through 5/20/24    SUBJECTIVE     Pt reports that L knee feels stiff and R knee hurts. Pt reports intermittent L knee giving way.     Medications: no changes since last session    OBJECTIVE       Findings 3/6/2024  4/3/2024        LEFS score 53/80 64/80    LEFS % function 66% 80%    L knee extension AROM 0° 0°    L knee flexion AROM 70° 116° w/ great effort, 111° easily    Girth mid patella L 44 cm 43.1 com    MMT 0-5      B hip flexion 3+ R 4, L 4+    L knee extension 3- 3+    Supine SLR 12° lag 30 reps      Gait: independent without device demonstrating decreased L knee flexion in swing phase and mild trunk shift  Stairs: reciprocal up using rail for balance, reciprocal down with decreased B knee control    Manual therapy (01317) x 10 min utilizing techniques to improve joint and/or soft tissue mobility, ROM, and function as well as helping to decrease pain/spasms and swelling.  Palpation and assessment of soft tissue, muscles, and landmarks   PFJ mobility  Posterior tibio-femoral mobilizations Grade III-IV    Therapeutic exercise (26560) x 30 min to

## 2024-04-11 ENCOUNTER — HOSPITAL ENCOUNTER (OUTPATIENT)
Dept: MAMMOGRAPHY | Age: 59
Discharge: HOME | End: 2024-04-11
Payer: COMMERCIAL

## 2024-04-11 ENCOUNTER — HOSPITAL ENCOUNTER (OUTPATIENT)
Dept: MAMMOGRAPHY | Age: 59
End: 2024-04-11
Payer: COMMERCIAL

## 2024-04-11 ENCOUNTER — CLINICAL DOCUMENTATION (OUTPATIENT)
Dept: ORTHOPEDIC SURGERY | Age: 59
End: 2024-04-11

## 2024-04-11 VITALS — HEIGHT: 63 IN | WEIGHT: 210 LBS | BODY MASS INDEX: 37.21 KG/M2

## 2024-04-11 DIAGNOSIS — Z78.0 ASYMPTOMATIC POSTMENOPAUSAL STATE: ICD-10-CM

## 2024-04-11 DIAGNOSIS — Z12.31 ENCOUNTER FOR SCREENING MAMMOGRAM FOR BREAST CANCER: ICD-10-CM

## 2024-04-11 PROCEDURE — 77063 BREAST TOMOSYNTHESIS BI: CPT

## 2024-04-11 PROCEDURE — 77080 DXA BONE DENSITY AXIAL: CPT

## 2024-04-12 ENCOUNTER — TREATMENT (OUTPATIENT)
Age: 59
End: 2024-04-12
Payer: COMMERCIAL

## 2024-04-12 DIAGNOSIS — M25.662 KNEE STIFFNESS, LEFT: Primary | ICD-10-CM

## 2024-04-12 DIAGNOSIS — M25.462 EFFUSION OF LEFT KNEE: ICD-10-CM

## 2024-04-12 DIAGNOSIS — R26.2 DIFFICULTY WALKING: ICD-10-CM

## 2024-04-12 DIAGNOSIS — M25.562 ACUTE PAIN OF LEFT KNEE: ICD-10-CM

## 2024-04-12 DIAGNOSIS — M62.81 MUSCLE WEAKNESS: ICD-10-CM

## 2024-04-12 PROCEDURE — 97110 THERAPEUTIC EXERCISES: CPT | Performed by: PHYSICAL THERAPIST

## 2024-04-12 PROCEDURE — 97140 MANUAL THERAPY 1/> REGIONS: CPT | Performed by: PHYSICAL THERAPIST

## 2024-04-12 NOTE — PROGRESS NOTES
L  Bilateral hamstring curls 40# 3x8  L TKE against 25# cord H5sec x 15  Sit to stand from hi/lo mat  @ standard chair height, with verbal cues for slow descent 2x10, R foot forward  3-inch lateral step downs x 15 L with BUE support  Bridges H10sec x 8  PROM L knee flexion/extension with pt in hooklying and in 90/90 position  Supine SLR x 39 consecutive reps L  6-inch step ups 2x15 L with B rails, focus on driving through quad on ascent and slow lowering with knee flexion to avoid compensation    ASSESSMENT     Pt continues to progress well with therapy demonstrating improved L knee stability in gait and increased ROM during PROM. Sit-stand completed with excellent control and primary use of LLE to ensure ready for R TKA 5/6/24.     PLAN     Progress strengthening and ROM, increase standing activity    GOALS     Goals:  Short term goals to be met by 4/3/2024  (4 weeks):  Pt will demonstrate good recall of HEP requiring minimal verbal cuing for proper form and technique.- MET 3/22/24  Pt will decrease swelling at involved LE/knee by 1 cm at mid-patella, which contributes to greater ROM and less pain with ADLs. Goal Met 4/3/2024   Increase knee AROM of involved LE to 105 degrees, in order to sit more comfortably. Goal Met 3/15/2024   Pt will demonstrate independent ambulation without device on even terrain for household distances. Goal Met 3/15/2024   Pt will ascend steps with a reciprocal pattern and descend with step to gait using rails for balance only, modified independent.Goal Met 4/3/2024   Improve LEFS to = 58/80, showing gains in ADLs and daily tasks. - MET 3/22/24  Pt will perform independent supine SLR without lag x 30 reps.-Goal Met 4/3/2024     Long term goals to be met by 6/4/2024- modified end date to 5/3/204  Pt will be compliant and independent with a comprehensive HEP and activity progression.   Demonstrate AROM of involved knee to be 115 degrees, allowing for ADLs with min restrictions related to

## 2024-04-15 ENCOUNTER — TREATMENT (OUTPATIENT)
Age: 59
End: 2024-04-15
Payer: COMMERCIAL

## 2024-04-15 ENCOUNTER — PREP FOR PROCEDURE (OUTPATIENT)
Dept: ORTHOPEDIC SURGERY | Age: 59
End: 2024-04-15

## 2024-04-15 DIAGNOSIS — M25.462 EFFUSION OF LEFT KNEE: ICD-10-CM

## 2024-04-15 DIAGNOSIS — R26.2 DIFFICULTY WALKING: ICD-10-CM

## 2024-04-15 DIAGNOSIS — Z01.818 PRE-OP EVALUATION: Primary | ICD-10-CM

## 2024-04-15 DIAGNOSIS — M25.662 KNEE STIFFNESS, LEFT: Primary | ICD-10-CM

## 2024-04-15 DIAGNOSIS — M25.562 ACUTE PAIN OF LEFT KNEE: ICD-10-CM

## 2024-04-15 DIAGNOSIS — M62.81 MUSCLE WEAKNESS: ICD-10-CM

## 2024-04-15 PROCEDURE — 97110 THERAPEUTIC EXERCISES: CPT | Performed by: PHYSICAL THERAPIST

## 2024-04-15 PROCEDURE — 97116 GAIT TRAINING THERAPY: CPT | Performed by: PHYSICAL THERAPIST

## 2024-04-15 PROCEDURE — 97140 MANUAL THERAPY 1/> REGIONS: CPT | Performed by: PHYSICAL THERAPIST

## 2024-04-15 RX ORDER — SODIUM CHLORIDE 0.9 % (FLUSH) 0.9 %
5-40 SYRINGE (ML) INJECTION PRN
Status: CANCELLED | OUTPATIENT
Start: 2024-04-15

## 2024-04-15 RX ORDER — SODIUM CHLORIDE 9 MG/ML
INJECTION, SOLUTION INTRAVENOUS PRN
Status: CANCELLED | OUTPATIENT
Start: 2024-04-15

## 2024-04-15 RX ORDER — SODIUM CHLORIDE 0.9 % (FLUSH) 0.9 %
5-40 SYRINGE (ML) INJECTION EVERY 12 HOURS SCHEDULED
Status: CANCELLED | OUTPATIENT
Start: 2024-04-15

## 2024-04-15 NOTE — PROGRESS NOTES
[facility] PT INT [facility]TH[facility]  [address] [place] [id number]  Dept: [phone]      Physical Therapy Daily Note     Referring MD: Ebenezer Aguilar MD  Diagnosis:     ICD-10-CM    1. Knee stiffness, left  M25.662       2. Acute pain of left knee  M25.562       3. Muscle weakness  M62.81       4. Difficulty walking  R26.2       5. Effusion of left knee  M25.462           Surgery: LEFT KNEE TOTAL ARTHROPLASTY ROBOTIC  Date: 2/7/2024    Therapy precautions: blood bourne illness  Co-morbidities affecting plan of care: Obesity, HIV, R TKA schedule 5/3/24, umbilical hernia   Patient Stated Goals: walk without cane, strong enough for R TKA    Payor: Payor: NAYANA /  /  /  Billing pattern: Commercial- substantial/midpoint time each CPT   Total Timed Procedure Codes: 58 min, Total Time: 58 min Modifier needed: No  Episode visit count:  15/21 authorized through 5/20/24    SUBJECTIVE     Pt reports that she has not used her cane in a week.     Medications: no changes since last session    OBJECTIVE       Findings 3/6/2024  4/3/2024        LEFS score 53/80 64/80    LEFS % function 66% 80%    L knee extension AROM 0° 0°    L knee flexion AROM 70° 116° w/ great effort, 111° easily    Girth mid patella L 44 cm 43.1 com    MMT 0-5      B hip flexion 3+ R 4, L 4+    L knee extension 3- 3+    Supine SLR 12° lag 30 reps      Manual therapy (26036) x 10 min utilizing techniques to improve joint and/or soft tissue mobility, ROM, and function as well as helping to decrease pain/spasms and swelling.  Palpation and assessment of soft tissue, muscles, and landmarks   PFJ mobility  Posterior tibio-femoral mobilizations Grade III-IV    Therapeutic exercise (76503) x 33 min to develop ROM, strength, endurance and flexibility of the LLE.  Reassessment as above  Upright bike level 1 x 5 min (2 holes showing)  Bilateral leg press 100# 2x15 w/ focus on end range flexion stretch  Single leg press with 50# 2x15 L  Bilateral

## 2024-04-17 ENCOUNTER — TREATMENT (OUTPATIENT)
Age: 59
End: 2024-04-17
Payer: COMMERCIAL

## 2024-04-17 DIAGNOSIS — M25.562 ACUTE PAIN OF LEFT KNEE: ICD-10-CM

## 2024-04-17 DIAGNOSIS — M25.662 KNEE STIFFNESS, LEFT: Primary | ICD-10-CM

## 2024-04-17 DIAGNOSIS — M62.81 MUSCLE WEAKNESS: ICD-10-CM

## 2024-04-17 DIAGNOSIS — M25.462 EFFUSION OF LEFT KNEE: ICD-10-CM

## 2024-04-17 DIAGNOSIS — R26.2 DIFFICULTY WALKING: ICD-10-CM

## 2024-04-17 PROCEDURE — 97140 MANUAL THERAPY 1/> REGIONS: CPT | Performed by: PHYSICAL THERAPIST

## 2024-04-17 PROCEDURE — 97110 THERAPEUTIC EXERCISES: CPT | Performed by: PHYSICAL THERAPIST

## 2024-04-17 NOTE — PROGRESS NOTES
[facility] PT [facility]  [address] [place] [address]  Dept: [phone]      Physical Therapy Discharge     Referring MD: Ebenezer Aguilar MD  Diagnosis:     ICD-10-CM    1. Knee stiffness, left  M25.662       2. Acute pain of left knee  M25.562       3. Muscle weakness  M62.81       4. Difficulty walking  R26.2       5. Effusion of left knee  M25.462           Surgery: LEFT KNEE TOTAL ARTHROPLASTY ROBOTIC  Date: 2/7/2024    Therapy precautions: blood bourne illness  Co-morbidities affecting plan of care: Obesity, HIV, R TKA schedule 5/3/24, umbilical hernia   Patient Stated Goals: walk without cane, strong enough for R TKA    Payor: Payor: NAYANA /  /  /  Billing pattern: Commercial- substantial/midpoint time each CPT   Total Timed Procedure Codes: 55 min, Total Time: 55 min Modifier needed: No  Episode visit count:  16/21 authorized through 5/20/24    SUBJECTIVE     Nature of condition: Recent onset (initial onset within last 3 months)  Describe current symptoms: R knee pain with planned TKA 5/6/24. L knee relatively pain free but is a little weak and seems to give out at times when walking. No loss of balance or falls.     Pain Assessment:  Average Pain/symptom intensity (0-10 scale)  Last 24 hours: 0/10  Last week (1-7 days): 1/10  How often do you feel symptoms? Intermittently (0-25%)    How much have your symptoms interfered with daily activities? A little bit  How has your condition changed since receiving care at this facility? Better  In general, would you say your current overall health is good       Medications: no changes since last session    OBJECTIVE       Findings 3/6/2024  4/3/2024   4/17/24     LEFS score 53/80 64/80 73/80   LEFS % function 66% 80% 91%   L knee extension AROM 0° 0° 0°   L knee flexion AROM 70° 116° w/ great effort, 111° easily 118°   Supine SLR 12° lag 30 reps 40 reps     Strength/MMT (0-5 Scale):    R 3/6 R 4/17 L 3/6 L 4/17 Comment   Hip Flexion 3+

## 2024-04-18 DIAGNOSIS — Z96.651 STATUS POST RIGHT KNEE REPLACEMENT: Primary | ICD-10-CM

## 2024-04-22 ENCOUNTER — HOSPITAL ENCOUNTER (OUTPATIENT)
Dept: SURGERY | Age: 59
Discharge: HOME | End: 2024-04-25
Payer: COMMERCIAL

## 2024-04-22 VITALS
HEART RATE: 81 BPM | DIASTOLIC BLOOD PRESSURE: 90 MMHG | WEIGHT: 210.54 LBS | RESPIRATION RATE: 16 BRPM | BODY MASS INDEX: 37.3 KG/M2 | SYSTOLIC BLOOD PRESSURE: 106 MMHG | TEMPERATURE: 98.5 F | OXYGEN SATURATION: 99 % | HEIGHT: 63 IN

## 2024-04-22 DIAGNOSIS — Z01.818 PRE-OP EVALUATION: ICD-10-CM

## 2024-04-22 LAB
ALBUMIN SERPL-MCNC: 3.2 G/DL (ref 3.2–4.6)
ANION GAP SERPL CALC-SCNC: 8 MMOL/L (ref 2–11)
BASOPHILS # BLD: 0 K/UL (ref 0–0.2)
BASOPHILS NFR BLD: 0 % (ref 0–2)
BUN SERPL-MCNC: 9 MG/DL (ref 8–23)
CALCIUM SERPL-MCNC: 9.2 MG/DL (ref 8.3–10.4)
CHLORIDE SERPL-SCNC: 106 MMOL/L (ref 103–113)
CO2 SERPL-SCNC: 30 MMOL/L (ref 21–32)
CREAT SERPL-MCNC: 0.83 MG/DL (ref 0.6–1)
DIFFERENTIAL METHOD BLD: ABNORMAL
EOSINOPHIL # BLD: 0.2 K/UL (ref 0–0.8)
EOSINOPHIL NFR BLD: 3 % (ref 0.5–7.8)
ERYTHROCYTE [DISTWIDTH] IN BLOOD BY AUTOMATED COUNT: 13.9 % (ref 11.9–14.6)
EST. AVERAGE GLUCOSE BLD GHB EST-MCNC: 100 MG/DL
GLUCOSE SERPL-MCNC: 91 MG/DL (ref 65–100)
HBA1C MFR BLD: 5.1 % (ref 4.8–5.6)
HCT VFR BLD AUTO: 30.4 % (ref 35.8–46.3)
HGB BLD-MCNC: 9.6 G/DL (ref 11.7–15.4)
IMM GRANULOCYTES # BLD AUTO: 0 K/UL (ref 0–0.5)
IMM GRANULOCYTES NFR BLD AUTO: 0 % (ref 0–5)
INR PPP: 1.1
LYMPHOCYTES # BLD: 1.6 K/UL (ref 0.5–4.6)
LYMPHOCYTES NFR BLD: 31 % (ref 13–44)
MCH RBC QN AUTO: 26.8 PG (ref 26.1–32.9)
MCHC RBC AUTO-ENTMCNC: 31.6 G/DL (ref 31.4–35)
MCV RBC AUTO: 84.9 FL (ref 82–102)
MONOCYTES # BLD: 0.5 K/UL (ref 0.1–1.3)
MONOCYTES NFR BLD: 9 % (ref 4–12)
NEUTS SEG # BLD: 2.9 K/UL (ref 1.7–8.2)
NEUTS SEG NFR BLD: 56 % (ref 43–78)
NRBC # BLD: 0 K/UL (ref 0–0.2)
PLATELET # BLD AUTO: 275 K/UL (ref 150–450)
PMV BLD AUTO: 10.2 FL (ref 9.4–12.3)
POTASSIUM SERPL-SCNC: 3 MMOL/L (ref 3.5–5.1)
PROTHROMBIN TIME: 14 SEC (ref 11.3–14.9)
RBC # BLD AUTO: 3.58 M/UL (ref 4.05–5.2)
SODIUM SERPL-SCNC: 144 MMOL/L (ref 136–146)
WBC # BLD AUTO: 5.2 K/UL (ref 4.3–11.1)

## 2024-04-22 PROCEDURE — 80048 BASIC METABOLIC PNL TOTAL CA: CPT

## 2024-04-22 PROCEDURE — 85610 PROTHROMBIN TIME: CPT

## 2024-04-22 PROCEDURE — 87641 MR-STAPH DNA AMP PROBE: CPT

## 2024-04-22 PROCEDURE — 83036 HEMOGLOBIN GLYCOSYLATED A1C: CPT

## 2024-04-22 PROCEDURE — 85025 COMPLETE CBC W/AUTO DIFF WBC: CPT

## 2024-04-22 PROCEDURE — 82040 ASSAY OF SERUM ALBUMIN: CPT

## 2024-04-22 PROCEDURE — 80307 DRUG TEST PRSMV CHEM ANLYZR: CPT

## 2024-04-22 NOTE — PROGRESS NOTES
Total Joint Surgery Preoperative Chart Review      Patient ID:  Sierra Grace  [id number]  59 y.o.  1965  Surgeon: Dr. Aguilar  Date of Surgery: 5/6/2024  Procedure: Total Right Knee Arthroplasty  Primary Care Physician: Kennedi Diggs [phone]  Specialty Physician(s):      Subjective:   Sierra Grace is a 59 y.o. Black /  female who presents for preoperative evaluation for Total Right Knee arthroplasty.    This is a preoperative chart review note based on data collected by the nurse at the surgical Pre-Assessment visit.    Past Medical History:   Diagnosis Date    Asymptomatic postmenopausal state     Autoimmune disease (HCC)     HIV    Daytime somnolence     Folic acid deficiency     HIV (human immunodeficiency virus infection) (HCC) 12/2/2016    [facility], per note (12/2023) \"Most recent labs performed on 12/1/2023 show a CD4 count of 1,030/51%-previously 831/49%-before that 921/54%-before that 950/53%-before that 843/50%. Viral load remains undetectable less than 20 which is normal for this patient.\"    Iron deficiency anemia     2 IV Injectafer infusions    Narcolepsy     CASSY on CPAP     newly dx--C-pap nightly    Other ill-defined conditions(799.89)     Tooth abcess    Primary osteoarthritis of left knee 11/27/2023      Past Surgical History:   Procedure Laterality Date    ORTHOPEDIC SURGERY      R foot fx    TOTAL KNEE ARTHROPLASTY Left 2/7/2024    LEFT KNEE TOTAL ARTHROPLASTY ROBOTIC, [place]/TRAVIS, Prevena, Vanc, Cellerate, Tobra/ SDD performed by Ebenezer Aguilar MD at [facility] MAIN OR     Family History   Problem Relation Age of Onset    Breast Cancer Sister 42      Social History     Tobacco Use    Smoking status: Never    Smokeless tobacco: Not on file   Substance Use Topics    Alcohol use: No       Prior to Admission medications    Medication Sig Start Date End Date Taking? Authorizing Provider   clindamycin (CLEOCIN) 300 MG capsule Take 2 capsules by mouth 1

## 2024-04-22 NOTE — PERIOP NOTE
PLEASE CONTINUE TAKING ALL PRESCRIPTION MEDICATIONS UP TO THE DAY OF SURGERY UNLESS OTHERWISE DIRECTED BELOW.    DISCONTINUE all vitamins, herbals and supplements 3 weeks prior to surgery. DISCONTINUE Non-Steroidal Anti-Inflammatory (NSAIDS) such as Advil, Ibuprofen, Motrin, Naproxen and Aleve 5 days prior to surgery.       Home Medications to take  the day of surgery    Albuterol inhaler, zyrtec           Home Medications to Hold- please continue all other medications except these.    None         Comments   On the day before surgery please take Acetaminophen 1000mg in the morning and then again before bed. You may substitute for Tylenol 650 mg.      Bring Inhaler, c-pap, Dynahex wash and Incentive Spirometer with you to hospital on the day of surgery.            Please do not bring home medications with you on the day of surgery unless otherwise directed by your nurse.  If you are instructed to bring home medications, please give them to your nurse as they will be administered by the nursing staff.    If you have any questions, please call [facility] [phone] option 7.    A copy of this note was provided to the patient for reference.

## 2024-04-22 NOTE — PROGRESS NOTES
The pathophysiology untreated sleep apnea has the potential to promote severe neurologic,cardiac.pulmonary and gastrointestinal problems.specifically, the increased incidence of hypertension,coronary artery disease,congestive heart failure,pulmonary hypertension,gastrointestinal reflux,pathologic hypersomnolence,memory loss and glucose intolerance are related to the consequences of hypoxemia,hypercapnia, airway obstruction and sympathetic overdrive.Pt states she will bring CPAP to hospital DOS and start using consistently  at home.

## 2024-04-22 NOTE — PERIOP NOTE
Patient verified name and .    Order for consent found in EHR and matches case posting; patient verified.     Type 3 surgery, PAT walk in assessment complete.    Labs per surgeon: CBC,BMP, PT, nicotine, albumin, HgbA1C; results pending.   Labs per anesthesia protocol: no additional lab work needed.   EKG: Found in EHR dated 24. EKG tracing ok'd by Dr. Jarquin for prior surgery on 24.     [facility] office note dated 12/15/23, pulmonology office note dated 24 and PCP office note dated 24 found in care everywhere if needed for anesthesia reference.    MRSA/MSSA swab collected; pharmacy to review and dose antibiotic as appropriate.     Hospital approved surgical skin cleanser and instructions to return bottle on DOS given per hospital policy.    Patient provided with handouts including Guide to Surgery, Pain Management, Hand Hygiene, Blood Transfusion Education, and [facility] Brochure.    Patient answered medical/surgical history questions at their best of ability. All prior to admission medications documented in [facility]. Original medication prescription bottle not visualized during patient appointment.     Patient instructed to hold all vitamins 3 weeks prior to surgery and NSAIDS 5 days prior to surgery.     Patient teach back successful and patient demonstrates knowledge of instruction.

## 2024-04-23 ENCOUNTER — TELEPHONE (OUTPATIENT)
Dept: SURGERY | Age: 59
End: 2024-04-23

## 2024-04-23 LAB
MRSA DNA SPEC QL NAA+PROBE: NOT DETECTED
S AUREUS CPE NOSE QL NAA+PROBE: NOT DETECTED

## 2024-04-23 NOTE — TELEPHONE ENCOUNTER
Your patient was seen in Pre-Admission Testing today. I am attaching the results of the labs for your to review and follow-up with if necessary. If you would like to order additional labs or tests please enter into [email or web address] or fax to [phone]. Please do not respond to this message as my mailbox is not monitored. You may call [phone] with questions or concerns.     Please note Potassium and HGB results.     Latest Reference Range & Units 04/22/24 14:04 04/22/24 14:05   Sodium 136 - 146 mmol/L  144   Potassium 3.5 - 5.1 mmol/L  3.0 (L)   Chloride 103 - 113 mmol/L  106   CARBON DIOXIDE 21 - 32 mmol/L  30   BUN,BUNPL 8 - 23 MG/DL  9   Creatinine 0.6 - 1.0 MG/DL  0.83   Anion Gap 2 - 11 mmol/L  8   Est, Glom Filt Rate >60 ml/min/1.73m2  81   Glucose, Random 65 - 100 mg/dL  91   Calcium, Total 8.3 - 10.4 MG/DL  9.2   Albumin 3.2 - 4.6 g/dL  3.2   Hemoglobin A1C 4.8 - 5.6 %  5.1   eAG (mg/dL) mg/dL  100   WBC 4.3 - 11.1 K/uL  5.2   RBC 4.05 - 5.2 M/uL  3.58 (L)   Hemoglobin Quant 11.7 - 15.4 g/dL  9.6 (L)   Hematocrit 35.8 - 46.3 %  30.4 (L)   MCV 82.0 - 102.0 FL  84.9   MCH 26.1 - 32.9 PG  26.8   MCHC 31.4 - 35.0 g/dL  31.6   MPV 9.4 - 12.3 FL  10.2   RDW 11.9 - 14.6 %  13.9   Platelet Count 150 - 450 K/uL  275   Neutrophils/100 leukocytes 43 - 78 %  56   Lymphocyte % 13 - 44 %  31   Monocytes % 4.0 - 12.0 %  9   Eosinophils % 0.5 - 7.8 %  3   Basophils % 0.0 - 2.0 %  0   Neutrophils Absolute 1.7 - 8.2 K/UL  2.9   Lymphocytes Absolute 0.5 - 4.6 K/UL  1.6   Monocytes Absolute 0.1 - 1.3 K/UL  0.5   Eosinophils Absolute 0.0 - 0.8 K/UL  0.2   Basophils Absolute 0.0 - 0.2 K/UL  0.0   Differential Type -    AUTOMATED   Immature Granulocytes % 0.0 - 5.0 %  0   Nucleated Red Blood Cells 0.0 - 0.2 K/uL  0.00   Immature Granulocytes Absolute 0.0 - 0.5 K/UL  0.0   Prothrombin Time 11.3 - 14.9 sec  14.0   INR -    1.1   MRSA by PCR NOTD   Not detected    MRSA/STAPH AUREUS DNA  Rpt    (L): Data is abnormally low  Rpt:

## 2024-04-24 LAB
COMMENT:: NORMAL
COTININE UR QL SCN: NEGATIVE NG/ML

## 2024-04-28 DIAGNOSIS — G89.18 POSTOPERATIVE PAIN: Primary | ICD-10-CM

## 2024-04-28 RX ORDER — ACETAMINOPHEN 500 MG
1000 TABLET ORAL EVERY 6 HOURS PRN
Qty: 180 TABLET | Refills: 2 | Status: SHIPPED | OUTPATIENT
Start: 2024-04-28

## 2024-04-28 RX ORDER — OMEPRAZOLE 40 MG/1
40 CAPSULE, DELAYED RELEASE ORAL DAILY
Qty: 30 CAPSULE | Refills: 0 | Status: SHIPPED | OUTPATIENT
Start: 2024-04-28

## 2024-04-28 RX ORDER — ASPIRIN 81 MG/1
81 TABLET ORAL 2 TIMES DAILY
Qty: 60 TABLET | Refills: 0 | Status: SHIPPED | OUTPATIENT
Start: 2024-04-28

## 2024-04-28 RX ORDER — ONDANSETRON 4 MG/1
4 TABLET, FILM COATED ORAL 3 TIMES DAILY PRN
Qty: 30 TABLET | Refills: 1 | Status: SHIPPED | OUTPATIENT
Start: 2024-04-28

## 2024-04-28 RX ORDER — MELOXICAM 15 MG/1
15 TABLET ORAL DAILY
Qty: 30 TABLET | Refills: 2 | Status: SHIPPED | OUTPATIENT
Start: 2024-04-28

## 2024-04-28 RX ORDER — ZOLPIDEM TARTRATE 5 MG/1
5 TABLET ORAL NIGHTLY PRN
Qty: 60 TABLET | Refills: 0 | Status: SHIPPED | OUTPATIENT
Start: 2024-04-28 | End: 2024-06-27

## 2024-04-28 RX ORDER — SENNOSIDES A AND B 8.6 MG/1
1 TABLET, FILM COATED ORAL 2 TIMES DAILY
Qty: 30 TABLET | Refills: 2 | Status: SHIPPED | OUTPATIENT
Start: 2024-04-28

## 2024-04-28 RX ORDER — GABAPENTIN 300 MG/1
300 CAPSULE ORAL 2 TIMES DAILY
Qty: 30 CAPSULE | Refills: 0 | Status: SHIPPED | OUTPATIENT
Start: 2024-04-28 | End: 2024-05-13

## 2024-04-28 RX ORDER — OXYCODONE HYDROCHLORIDE 5 MG/1
10 TABLET ORAL EVERY 4 HOURS PRN
Qty: 60 TABLET | Refills: 0 | Status: SHIPPED | OUTPATIENT
Start: 2024-04-28 | End: 2024-05-05

## 2024-04-28 RX ORDER — CYCLOBENZAPRINE HCL 5 MG
5 TABLET ORAL 3 TIMES DAILY PRN
Qty: 30 TABLET | Refills: 0 | Status: SHIPPED | OUTPATIENT
Start: 2024-04-28 | End: 2024-05-08

## 2024-05-02 ENCOUNTER — OFFICE VISIT (OUTPATIENT)
Dept: ORTHOPEDIC SURGERY | Age: 59
End: 2024-05-02
Payer: COMMERCIAL

## 2024-05-02 DIAGNOSIS — M17.11 PRIMARY OSTEOARTHRITIS OF RIGHT KNEE: ICD-10-CM

## 2024-05-02 DIAGNOSIS — M21.961 ACQUIRED DEFORMITY OF RIGHT KNEE: ICD-10-CM

## 2024-05-02 DIAGNOSIS — Z09 FOLLOW-UP EXAMINATION FOLLOWING SURGERY: ICD-10-CM

## 2024-05-02 DIAGNOSIS — G89.18 POSTOPERATIVE PAIN: ICD-10-CM

## 2024-05-02 DIAGNOSIS — Z96.652 STATUS POST LEFT KNEE REPLACEMENT: Primary | ICD-10-CM

## 2024-05-02 PROCEDURE — 99213 OFFICE O/P EST LOW 20 MIN: CPT | Performed by: ORTHOPAEDIC SURGERY

## 2024-05-02 RX ORDER — OXYCODONE HYDROCHLORIDE 5 MG/1
10 TABLET ORAL EVERY 4 HOURS PRN
Qty: 60 TABLET | Refills: 0 | Status: SHIPPED | OUTPATIENT
Start: 2024-05-02 | End: 2024-05-09

## 2024-05-02 NOTE — PROGRESS NOTES
Patient ID:  Sierra Grace  [id number]  59 y.o.  1965    Today: May 2, 2024    CHIEF COMPLAINT:  Follow-up left total knee replacement    HISTORY:  The patient presents today approximately 4 months after knee replacement.  The patient is doing very well. The patient is able to perform most if not all required and desired activities. Is no longer taking higher dose pain medications and is only requiring occasional over-the-counter anti-inflammatories and/or Tylenol. Continues to work on stretching and strengthening of the limb.    Patient has known right knee OA and is scheduled for surgery.    Past Medical History:  Past Medical History:   Diagnosis Date    Asymptomatic postmenopausal state     Autoimmune disease (HCC)     HIV    Daytime somnolence     Folic acid deficiency     HIV (human immunodeficiency virus infection) ([facility]) 12/2/2016    [place] [facility], per note (12/2023) \"Most recent labs performed on 12/1/2023 show a CD4 count of 1,030/51%-previously 831/49%-before that 921/54%-before that 950/53%-before that 843/50%. Viral load remains undetectable less than 20 which is normal for this patient.\"    Iron deficiency anemia     2 IV Injectafer infusions    Narcolepsy     CASSY on CPAP     newly dx--C-pap nightly    Other ill-defined conditions(799.89)     Tooth abcess    Primary osteoarthritis of left knee 11/27/2023       Past Surgical History:  Past Surgical History:   Procedure Laterality Date    ORTHOPEDIC SURGERY      R foot fx    TOTAL KNEE ARTHROPLASTY Left 2/7/2024    LEFT KNEE TOTAL ARTHROPLASTY ROBOTIC, Ram[place]/TRAVIS, Prevena, Vanc, Cellerate, Tobra/ SDD performed by Ebenezer Aguilar MD at [facility] MAIN OR        Medications:     Prior to Admission medications    Medication Sig Start Date End Date Taking? Authorizing Provider   oxyCODONE (ROXICODONE) 5 MG immediate release tablet Take 2 tablets by mouth every 4 hours as needed for Pain for up to 7 days. Disregard the directions to

## 2024-05-05 ENCOUNTER — ANESTHESIA EVENT (OUTPATIENT)
Dept: SURGERY | Age: 59
End: 2024-05-05
Payer: COMMERCIAL

## 2024-05-06 ENCOUNTER — ANESTHESIA (OUTPATIENT)
Dept: SURGERY | Age: 59
End: 2024-05-06
Payer: COMMERCIAL

## 2024-05-06 ENCOUNTER — HOME HEALTH ADMISSION (OUTPATIENT)
Dept: HOME HEALTH SERVICES | Facility: HOME HEALTH | Age: 59
End: 2024-05-06
Payer: COMMERCIAL

## 2024-05-06 ENCOUNTER — HOSPITAL ENCOUNTER (OUTPATIENT)
Age: 59
Discharge: HOME HEALTH | End: 2024-05-07
Attending: ORTHOPAEDIC SURGERY | Admitting: ORTHOPAEDIC SURGERY
Payer: COMMERCIAL

## 2024-05-06 PROBLEM — M17.11 OSTEOARTHRITIS OF RIGHT KNEE, UNSPECIFIED OSTEOARTHRITIS TYPE: Status: ACTIVE | Noted: 2024-05-06

## 2024-05-06 PROCEDURE — 2709999900 HC NON-CHARGEABLE SUPPLY: Performed by: ORTHOPAEDIC SURGERY

## 2024-05-06 PROCEDURE — 94760 N-INVAS EAR/PLS OXIMETRY 1: CPT

## 2024-05-06 PROCEDURE — 97530 THERAPEUTIC ACTIVITIES: CPT

## 2024-05-06 PROCEDURE — 3700000000 HC ANESTHESIA ATTENDED CARE: Performed by: ORTHOPAEDIC SURGERY

## 2024-05-06 PROCEDURE — 6360000002 HC RX W HCPCS: Performed by: ANESTHESIOLOGY

## 2024-05-06 PROCEDURE — C1713 ANCHOR/SCREW BN/BN,TIS/BN: HCPCS | Performed by: ORTHOPAEDIC SURGERY

## 2024-05-06 PROCEDURE — 94761 N-INVAS EAR/PLS OXIMETRY MLT: CPT

## 2024-05-06 PROCEDURE — 7100000001 HC PACU RECOVERY - ADDTL 15 MIN: Performed by: ORTHOPAEDIC SURGERY

## 2024-05-06 PROCEDURE — 97165 OT EVAL LOW COMPLEX 30 MIN: CPT

## 2024-05-06 PROCEDURE — 2580000003 HC RX 258: Performed by: NURSE PRACTITIONER

## 2024-05-06 PROCEDURE — 3600000015 HC SURGERY LEVEL 5 ADDTL 15MIN: Performed by: ORTHOPAEDIC SURGERY

## 2024-05-06 PROCEDURE — 6360000002 HC RX W HCPCS: Performed by: NURSE ANESTHETIST, CERTIFIED REGISTERED

## 2024-05-06 PROCEDURE — 94664 DEMO&/EVAL PT USE INHALER: CPT

## 2024-05-06 PROCEDURE — 6360000002 HC RX W HCPCS: Performed by: NURSE PRACTITIONER

## 2024-05-06 PROCEDURE — C1776 JOINT DEVICE (IMPLANTABLE): HCPCS | Performed by: ORTHOPAEDIC SURGERY

## 2024-05-06 PROCEDURE — 2580000003 HC RX 258: Performed by: NURSE ANESTHETIST, CERTIFIED REGISTERED

## 2024-05-06 PROCEDURE — 97535 SELF CARE MNGMENT TRAINING: CPT

## 2024-05-06 PROCEDURE — 6370000000 HC RX 637 (ALT 250 FOR IP): Performed by: ANESTHESIOLOGY

## 2024-05-06 PROCEDURE — 27447 TOTAL KNEE ARTHROPLASTY: CPT | Performed by: ORTHOPAEDIC SURGERY

## 2024-05-06 PROCEDURE — 2500000003 HC RX 250 WO HCPCS: Performed by: NURSE ANESTHETIST, CERTIFIED REGISTERED

## 2024-05-06 PROCEDURE — 2720000010 HC SURG SUPPLY STERILE: Performed by: ORTHOPAEDIC SURGERY

## 2024-05-06 PROCEDURE — 97161 PT EVAL LOW COMPLEX 20 MIN: CPT

## 2024-05-06 PROCEDURE — 7100000000 HC PACU RECOVERY - FIRST 15 MIN: Performed by: ORTHOPAEDIC SURGERY

## 2024-05-06 PROCEDURE — 6360000002 HC RX W HCPCS: Performed by: ORTHOPAEDIC SURGERY

## 2024-05-06 PROCEDURE — 20985 CPTR-ASST DIR MS PX: CPT | Performed by: ORTHOPAEDIC SURGERY

## 2024-05-06 PROCEDURE — 3600000005 HC SURGERY LEVEL 5 BASE: Performed by: ORTHOPAEDIC SURGERY

## 2024-05-06 PROCEDURE — 3700000001 HC ADD 15 MINUTES (ANESTHESIA): Performed by: ORTHOPAEDIC SURGERY

## 2024-05-06 PROCEDURE — 6370000000 HC RX 637 (ALT 250 FOR IP): Performed by: NURSE PRACTITIONER

## 2024-05-06 PROCEDURE — 64447 NJX AA&/STRD FEMORAL NRV IMG: CPT | Performed by: ANESTHESIOLOGY

## 2024-05-06 DEVICE — TIBIAL COMPONENT
Type: IMPLANTABLE DEVICE | Site: KNEE | Status: FUNCTIONAL
Brand: TRIATHLON

## 2024-05-06 DEVICE — TIBIAL BEARING INSERT - CS
Type: IMPLANTABLE DEVICE | Site: KNEE | Status: FUNCTIONAL
Brand: TRIATHLON

## 2024-05-06 DEVICE — COMPONENT TOT KNEE CAPPED PRIMARY K2STRYKER] STRYKER CORP]: Type: IMPLANTABLE DEVICE | Status: FUNCTIONAL

## 2024-05-06 DEVICE — CRUCIATE RETAINING FEMORAL
Type: IMPLANTABLE DEVICE | Site: KNEE | Status: FUNCTIONAL
Brand: TRIATHLON

## 2024-05-06 RX ORDER — VANCOMYCIN HYDROCHLORIDE 1 G/20ML
INJECTION, POWDER, LYOPHILIZED, FOR SOLUTION INTRAVENOUS PRN
Status: DISCONTINUED | OUTPATIENT
Start: 2024-05-06 | End: 2024-05-06 | Stop reason: ALTCHOICE

## 2024-05-06 RX ORDER — ROCURONIUM BROMIDE 10 MG/ML
INJECTION, SOLUTION INTRAVENOUS PRN
Status: DISCONTINUED | OUTPATIENT
Start: 2024-05-06 | End: 2024-05-06 | Stop reason: SDUPTHER

## 2024-05-06 RX ORDER — SENNA AND DOCUSATE SODIUM 50; 8.6 MG/1; MG/1
1 TABLET, FILM COATED ORAL 2 TIMES DAILY
Status: DISCONTINUED | OUTPATIENT
Start: 2024-05-06 | End: 2024-05-07 | Stop reason: HOSPADM

## 2024-05-06 RX ORDER — SODIUM CHLORIDE 0.9 % (FLUSH) 0.9 %
5-40 SYRINGE (ML) INJECTION PRN
Status: DISCONTINUED | OUTPATIENT
Start: 2024-05-06 | End: 2024-05-06 | Stop reason: HOSPADM

## 2024-05-06 RX ORDER — ZOLPIDEM TARTRATE 5 MG/1
5 TABLET ORAL NIGHTLY PRN
Status: DISCONTINUED | OUTPATIENT
Start: 2024-05-06 | End: 2024-05-07 | Stop reason: HOSPADM

## 2024-05-06 RX ORDER — DIPHENHYDRAMINE HCL 25 MG
25 CAPSULE ORAL EVERY 6 HOURS PRN
Status: DISCONTINUED | OUTPATIENT
Start: 2024-05-06 | End: 2024-05-07 | Stop reason: HOSPADM

## 2024-05-06 RX ORDER — TOBRAMYCIN 1.2 G/30ML
INJECTION, POWDER, LYOPHILIZED, FOR SOLUTION INTRAVENOUS PRN
Status: DISCONTINUED | OUTPATIENT
Start: 2024-05-06 | End: 2024-05-06 | Stop reason: ALTCHOICE

## 2024-05-06 RX ORDER — KETOROLAC TROMETHAMINE 15 MG/ML
15 INJECTION, SOLUTION INTRAMUSCULAR; INTRAVENOUS EVERY 8 HOURS
Status: DISCONTINUED | OUTPATIENT
Start: 2024-05-06 | End: 2024-05-07 | Stop reason: HOSPADM

## 2024-05-06 RX ORDER — SODIUM CHLORIDE, SODIUM LACTATE, POTASSIUM CHLORIDE, CALCIUM CHLORIDE 600; 310; 30; 20 MG/100ML; MG/100ML; MG/100ML; MG/100ML
INJECTION, SOLUTION INTRAVENOUS CONTINUOUS PRN
Status: DISCONTINUED | OUTPATIENT
Start: 2024-05-06 | End: 2024-05-06 | Stop reason: SDUPTHER

## 2024-05-06 RX ORDER — TRANEXAMIC ACID 100 MG/ML
INJECTION, SOLUTION INTRAVENOUS PRN
Status: DISCONTINUED | OUTPATIENT
Start: 2024-05-06 | End: 2024-05-06 | Stop reason: SDUPTHER

## 2024-05-06 RX ORDER — ONDANSETRON 2 MG/ML
4 INJECTION INTRAMUSCULAR; INTRAVENOUS
Status: DISCONTINUED | OUTPATIENT
Start: 2024-05-06 | End: 2024-05-06 | Stop reason: HOSPADM

## 2024-05-06 RX ORDER — OXYCODONE HYDROCHLORIDE 5 MG/1
10 TABLET ORAL PRN
Status: COMPLETED | OUTPATIENT
Start: 2024-05-06 | End: 2024-05-06

## 2024-05-06 RX ORDER — SODIUM CHLORIDE 0.9 % (FLUSH) 0.9 %
5-40 SYRINGE (ML) INJECTION PRN
Status: DISCONTINUED | OUTPATIENT
Start: 2024-05-06 | End: 2024-05-06 | Stop reason: SDUPTHER

## 2024-05-06 RX ORDER — TRANEXAMIC ACID 650 MG/1
1300 TABLET ORAL
Status: COMPLETED | OUTPATIENT
Start: 2024-05-06 | End: 2024-05-06

## 2024-05-06 RX ORDER — GLYCOPYRROLATE 0.2 MG/ML
INJECTION INTRAMUSCULAR; INTRAVENOUS PRN
Status: DISCONTINUED | OUTPATIENT
Start: 2024-05-06 | End: 2024-05-06 | Stop reason: SDUPTHER

## 2024-05-06 RX ORDER — ROPIVACAINE HYDROCHLORIDE 2 MG/ML
INJECTION, SOLUTION EPIDURAL; INFILTRATION; PERINEURAL PRN
Status: DISCONTINUED | OUTPATIENT
Start: 2024-05-06 | End: 2024-05-06 | Stop reason: ALTCHOICE

## 2024-05-06 RX ORDER — SODIUM CHLORIDE 9 MG/ML
INJECTION, SOLUTION INTRAVENOUS PRN
Status: DISCONTINUED | OUTPATIENT
Start: 2024-05-06 | End: 2024-05-06 | Stop reason: HOSPADM

## 2024-05-06 RX ORDER — NALOXONE HYDROCHLORIDE 0.4 MG/ML
0.4 INJECTION, SOLUTION INTRAMUSCULAR; INTRAVENOUS; SUBCUTANEOUS PRN
Status: DISCONTINUED | OUTPATIENT
Start: 2024-05-06 | End: 2024-05-07 | Stop reason: HOSPADM

## 2024-05-06 RX ORDER — SODIUM CHLORIDE 9 MG/ML
INJECTION, SOLUTION INTRAVENOUS PRN
Status: DISCONTINUED | OUTPATIENT
Start: 2024-05-06 | End: 2024-05-06 | Stop reason: SDUPTHER

## 2024-05-06 RX ORDER — GABAPENTIN 300 MG/1
300 CAPSULE ORAL 2 TIMES DAILY
Status: DISCONTINUED | OUTPATIENT
Start: 2024-05-06 | End: 2024-05-07 | Stop reason: HOSPADM

## 2024-05-06 RX ORDER — ALBUTEROL SULFATE 90 UG/1
2 AEROSOL, METERED RESPIRATORY (INHALATION) EVERY 6 HOURS PRN
Status: DISCONTINUED | OUTPATIENT
Start: 2024-05-06 | End: 2024-05-07 | Stop reason: HOSPADM

## 2024-05-06 RX ORDER — ONDANSETRON 2 MG/ML
4 INJECTION INTRAMUSCULAR; INTRAVENOUS EVERY 6 HOURS PRN
Status: DISCONTINUED | OUTPATIENT
Start: 2024-05-06 | End: 2024-05-07 | Stop reason: HOSPADM

## 2024-05-06 RX ORDER — SODIUM CHLORIDE 0.9 % (FLUSH) 0.9 %
5-40 SYRINGE (ML) INJECTION EVERY 12 HOURS SCHEDULED
Status: DISCONTINUED | OUTPATIENT
Start: 2024-05-06 | End: 2024-05-07 | Stop reason: HOSPADM

## 2024-05-06 RX ORDER — MELOXICAM 7.5 MG/1
7.5 TABLET ORAL 2 TIMES DAILY
Status: DISCONTINUED | OUTPATIENT
Start: 2024-05-09 | End: 2024-05-07 | Stop reason: HOSPADM

## 2024-05-06 RX ORDER — KETOROLAC TROMETHAMINE 30 MG/ML
INJECTION, SOLUTION INTRAMUSCULAR; INTRAVENOUS PRN
Status: DISCONTINUED | OUTPATIENT
Start: 2024-05-06 | End: 2024-05-06 | Stop reason: ALTCHOICE

## 2024-05-06 RX ORDER — LIDOCAINE HYDROCHLORIDE 20 MG/ML
INJECTION, SOLUTION EPIDURAL; INFILTRATION; INTRACAUDAL; PERINEURAL PRN
Status: DISCONTINUED | OUTPATIENT
Start: 2024-05-06 | End: 2024-05-06 | Stop reason: SDUPTHER

## 2024-05-06 RX ORDER — DIPHENHYDRAMINE HYDROCHLORIDE 50 MG/ML
25 INJECTION INTRAMUSCULAR; INTRAVENOUS EVERY 6 HOURS PRN
Status: DISCONTINUED | OUTPATIENT
Start: 2024-05-06 | End: 2024-05-07 | Stop reason: HOSPADM

## 2024-05-06 RX ORDER — ASPIRIN 81 MG/1
81 TABLET ORAL 2 TIMES DAILY
Status: DISCONTINUED | OUTPATIENT
Start: 2024-05-06 | End: 2024-05-07 | Stop reason: HOSPADM

## 2024-05-06 RX ORDER — ACETAMINOPHEN 500 MG
1000 TABLET ORAL EVERY 6 HOURS
Status: DISCONTINUED | OUTPATIENT
Start: 2024-05-06 | End: 2024-05-07 | Stop reason: HOSPADM

## 2024-05-06 RX ORDER — ESMOLOL HYDROCHLORIDE 10 MG/ML
INJECTION INTRAVENOUS PRN
Status: DISCONTINUED | OUTPATIENT
Start: 2024-05-06 | End: 2024-05-06 | Stop reason: SDUPTHER

## 2024-05-06 RX ORDER — OXYCODONE HCL 10 MG/1
10 TABLET, FILM COATED, EXTENDED RELEASE ORAL EVERY 12 HOURS SCHEDULED
Status: DISCONTINUED | OUTPATIENT
Start: 2024-05-06 | End: 2024-05-07 | Stop reason: HOSPADM

## 2024-05-06 RX ORDER — ALBUTEROL SULFATE 2.5 MG/3ML
2.5 SOLUTION RESPIRATORY (INHALATION) EVERY 6 HOURS PRN
Status: DISCONTINUED | OUTPATIENT
Start: 2024-05-06 | End: 2024-05-07 | Stop reason: HOSPADM

## 2024-05-06 RX ORDER — OXYCODONE HYDROCHLORIDE 5 MG/1
5 TABLET ORAL EVERY 4 HOURS PRN
Status: DISCONTINUED | OUTPATIENT
Start: 2024-05-06 | End: 2024-05-07 | Stop reason: HOSPADM

## 2024-05-06 RX ORDER — SODIUM CHLORIDE 0.9 % (FLUSH) 0.9 %
5-40 SYRINGE (ML) INJECTION EVERY 12 HOURS SCHEDULED
Status: DISCONTINUED | OUTPATIENT
Start: 2024-05-06 | End: 2024-05-06 | Stop reason: SDUPTHER

## 2024-05-06 RX ORDER — SODIUM CHLORIDE 0.9 % (FLUSH) 0.9 %
5-40 SYRINGE (ML) INJECTION PRN
Status: DISCONTINUED | OUTPATIENT
Start: 2024-05-06 | End: 2024-05-07 | Stop reason: HOSPADM

## 2024-05-06 RX ORDER — HYDROMORPHONE HYDROCHLORIDE 1 MG/ML
0.25 INJECTION, SOLUTION INTRAMUSCULAR; INTRAVENOUS; SUBCUTANEOUS EVERY 5 MIN PRN
Status: DISCONTINUED | OUTPATIENT
Start: 2024-05-06 | End: 2024-05-06 | Stop reason: HOSPADM

## 2024-05-06 RX ORDER — KETOROLAC TROMETHAMINE 30 MG/ML
INJECTION, SOLUTION INTRAMUSCULAR; INTRAVENOUS PRN
Status: DISCONTINUED | OUTPATIENT
Start: 2024-05-06 | End: 2024-05-06 | Stop reason: SDUPTHER

## 2024-05-06 RX ORDER — PROPOFOL 10 MG/ML
INJECTION, EMULSION INTRAVENOUS PRN
Status: DISCONTINUED | OUTPATIENT
Start: 2024-05-06 | End: 2024-05-06 | Stop reason: SDUPTHER

## 2024-05-06 RX ORDER — ONDANSETRON 4 MG/1
8 TABLET, ORALLY DISINTEGRATING ORAL EVERY 8 HOURS PRN
Status: DISCONTINUED | OUTPATIENT
Start: 2024-05-06 | End: 2024-05-06 | Stop reason: SDUPTHER

## 2024-05-06 RX ORDER — HYDROMORPHONE HYDROCHLORIDE 1 MG/ML
0.5 INJECTION, SOLUTION INTRAMUSCULAR; INTRAVENOUS; SUBCUTANEOUS EVERY 5 MIN PRN
Status: DISCONTINUED | OUTPATIENT
Start: 2024-05-06 | End: 2024-05-06 | Stop reason: HOSPADM

## 2024-05-06 RX ORDER — DEXAMETHASONE SODIUM PHOSPHATE 10 MG/ML
INJECTION INTRAMUSCULAR; INTRAVENOUS PRN
Status: DISCONTINUED | OUTPATIENT
Start: 2024-05-06 | End: 2024-05-06 | Stop reason: SDUPTHER

## 2024-05-06 RX ORDER — SODIUM CHLORIDE 0.9 % (FLUSH) 0.9 %
5-40 SYRINGE (ML) INJECTION EVERY 12 HOURS SCHEDULED
Status: DISCONTINUED | OUTPATIENT
Start: 2024-05-06 | End: 2024-05-06 | Stop reason: HOSPADM

## 2024-05-06 RX ORDER — NEOSTIGMINE METHYLSULFATE 1 MG/ML
INJECTION, SOLUTION INTRAVENOUS PRN
Status: DISCONTINUED | OUTPATIENT
Start: 2024-05-06 | End: 2024-05-06 | Stop reason: SDUPTHER

## 2024-05-06 RX ORDER — ONDANSETRON 4 MG/1
4 TABLET, ORALLY DISINTEGRATING ORAL EVERY 8 HOURS PRN
Status: DISCONTINUED | OUTPATIENT
Start: 2024-05-06 | End: 2024-05-07 | Stop reason: HOSPADM

## 2024-05-06 RX ORDER — OXYCODONE HYDROCHLORIDE 5 MG/1
10 TABLET ORAL EVERY 4 HOURS PRN
Status: DISCONTINUED | OUTPATIENT
Start: 2024-05-06 | End: 2024-05-07 | Stop reason: HOSPADM

## 2024-05-06 RX ORDER — CYCLOBENZAPRINE HCL 5 MG
5 TABLET ORAL 3 TIMES DAILY PRN
Status: DISCONTINUED | OUTPATIENT
Start: 2024-05-06 | End: 2024-05-07 | Stop reason: HOSPADM

## 2024-05-06 RX ORDER — DEXAMETHASONE SODIUM PHOSPHATE 10 MG/ML
INJECTION, SOLUTION INTRAMUSCULAR; INTRAVENOUS
Status: COMPLETED | OUTPATIENT
Start: 2024-05-06 | End: 2024-05-06

## 2024-05-06 RX ORDER — SODIUM CHLORIDE, SODIUM LACTATE, POTASSIUM CHLORIDE, CALCIUM CHLORIDE 600; 310; 30; 20 MG/100ML; MG/100ML; MG/100ML; MG/100ML
INJECTION, SOLUTION INTRAVENOUS CONTINUOUS
Status: DISCONTINUED | OUTPATIENT
Start: 2024-05-06 | End: 2024-05-06 | Stop reason: HOSPADM

## 2024-05-06 RX ORDER — FENTANYL CITRATE 50 UG/ML
100 INJECTION, SOLUTION INTRAMUSCULAR; INTRAVENOUS
Status: COMPLETED | OUTPATIENT
Start: 2024-05-06 | End: 2024-05-06

## 2024-05-06 RX ORDER — ROPIVACAINE HYDROCHLORIDE 2 MG/ML
INJECTION, SOLUTION EPIDURAL; INFILTRATION; PERINEURAL
Status: COMPLETED | OUTPATIENT
Start: 2024-05-06 | End: 2024-05-06

## 2024-05-06 RX ORDER — DEXAMETHASONE SODIUM PHOSPHATE 10 MG/ML
10 INJECTION INTRAMUSCULAR; INTRAVENOUS ONCE
Status: DISCONTINUED | OUTPATIENT
Start: 2024-05-07 | End: 2024-05-07 | Stop reason: HOSPADM

## 2024-05-06 RX ORDER — OXYCODONE HYDROCHLORIDE 5 MG/1
5 TABLET ORAL PRN
Status: COMPLETED | OUTPATIENT
Start: 2024-05-06 | End: 2024-05-06

## 2024-05-06 RX ORDER — PANTOPRAZOLE SODIUM 40 MG/1
40 TABLET, DELAYED RELEASE ORAL
Status: DISCONTINUED | OUTPATIENT
Start: 2024-05-07 | End: 2024-05-07 | Stop reason: HOSPADM

## 2024-05-06 RX ORDER — SODIUM CHLORIDE 9 MG/ML
INJECTION, SOLUTION INTRAVENOUS PRN
Status: DISCONTINUED | OUTPATIENT
Start: 2024-05-06 | End: 2024-05-07 | Stop reason: HOSPADM

## 2024-05-06 RX ORDER — MIDAZOLAM HYDROCHLORIDE 2 MG/2ML
2 INJECTION, SOLUTION INTRAMUSCULAR; INTRAVENOUS
Status: COMPLETED | OUTPATIENT
Start: 2024-05-06 | End: 2024-05-06

## 2024-05-06 RX ORDER — SODIUM CHLORIDE 9 MG/ML
INJECTION, SOLUTION INTRAVENOUS CONTINUOUS
Status: DISCONTINUED | OUTPATIENT
Start: 2024-05-06 | End: 2024-05-06 | Stop reason: HOSPADM

## 2024-05-06 RX ORDER — NALOXONE HYDROCHLORIDE 0.4 MG/ML
INJECTION, SOLUTION INTRAMUSCULAR; INTRAVENOUS; SUBCUTANEOUS PRN
Status: DISCONTINUED | OUTPATIENT
Start: 2024-05-06 | End: 2024-05-06 | Stop reason: HOSPADM

## 2024-05-06 RX ORDER — CETIRIZINE HYDROCHLORIDE 10 MG/1
10 TABLET ORAL DAILY
Status: DISCONTINUED | OUTPATIENT
Start: 2024-05-07 | End: 2024-05-07 | Stop reason: HOSPADM

## 2024-05-06 RX ORDER — SODIUM CHLORIDE 9 MG/ML
INJECTION, SOLUTION INTRAVENOUS CONTINUOUS
Status: DISCONTINUED | OUTPATIENT
Start: 2024-05-06 | End: 2024-05-07 | Stop reason: HOSPADM

## 2024-05-06 RX ORDER — LIDOCAINE HYDROCHLORIDE 10 MG/ML
1 INJECTION, SOLUTION INFILTRATION; PERINEURAL
Status: DISCONTINUED | OUTPATIENT
Start: 2024-05-06 | End: 2024-05-06 | Stop reason: HOSPADM

## 2024-05-06 RX ORDER — DIPHENHYDRAMINE HYDROCHLORIDE 50 MG/ML
6.25 INJECTION INTRAMUSCULAR; INTRAVENOUS
Status: DISCONTINUED | OUTPATIENT
Start: 2024-05-06 | End: 2024-05-06 | Stop reason: HOSPADM

## 2024-05-06 RX ORDER — ONDANSETRON 2 MG/ML
INJECTION INTRAMUSCULAR; INTRAVENOUS PRN
Status: DISCONTINUED | OUTPATIENT
Start: 2024-05-06 | End: 2024-05-06 | Stop reason: SDUPTHER

## 2024-05-06 RX ORDER — FENTANYL CITRATE 50 UG/ML
25 INJECTION, SOLUTION INTRAMUSCULAR; INTRAVENOUS
Status: COMPLETED | OUTPATIENT
Start: 2024-05-06 | End: 2024-05-06

## 2024-05-06 RX ADMIN — ROPIVACAINE HYDROCHLORIDE 20 ML: 2 INJECTION, SOLUTION EPIDURAL; INFILTRATION at 08:22

## 2024-05-06 RX ADMIN — KETOROLAC TROMETHAMINE 30 MG: 30 INJECTION, SOLUTION INTRAMUSCULAR at 10:11

## 2024-05-06 RX ADMIN — SODIUM CHLORIDE, SODIUM LACTATE, POTASSIUM CHLORIDE, AND CALCIUM CHLORIDE: 600; 310; 30; 20 INJECTION, SOLUTION INTRAVENOUS at 09:15

## 2024-05-06 RX ADMIN — HYDROMORPHONE HYDROCHLORIDE 0.5 MG: 1 INJECTION, SOLUTION INTRAMUSCULAR; INTRAVENOUS; SUBCUTANEOUS at 11:21

## 2024-05-06 RX ADMIN — HYDROMORPHONE HYDROCHLORIDE 0.5 MG: 1 INJECTION, SOLUTION INTRAMUSCULAR; INTRAVENOUS; SUBCUTANEOUS at 11:02

## 2024-05-06 RX ADMIN — LIDOCAINE HYDROCHLORIDE 80 MG: 20 INJECTION, SOLUTION EPIDURAL; INFILTRATION; INTRACAUDAL; PERINEURAL at 08:54

## 2024-05-06 RX ADMIN — SODIUM CHLORIDE, SODIUM LACTATE, POTASSIUM CHLORIDE, AND CALCIUM CHLORIDE: 600; 310; 30; 20 INJECTION, SOLUTION INTRAVENOUS at 08:47

## 2024-05-06 RX ADMIN — PHENYLEPHRINE HYDROCHLORIDE 100 MCG: 0.1 INJECTION, SOLUTION INTRAVENOUS at 09:33

## 2024-05-06 RX ADMIN — ONDANSETRON 4 MG: 2 INJECTION INTRAMUSCULAR; INTRAVENOUS at 09:07

## 2024-05-06 RX ADMIN — ROCURONIUM BROMIDE 40 MG: 10 INJECTION, SOLUTION INTRAVENOUS at 08:54

## 2024-05-06 RX ADMIN — TRANEXAMIC ACID 1300 MG: 650 TABLET ORAL at 15:32

## 2024-05-06 RX ADMIN — ACETAMINOPHEN 1000 MG: 500 TABLET, FILM COATED ORAL at 17:28

## 2024-05-06 RX ADMIN — DOLUTEGRAVIR SODIUM 50 MG: 50 TABLET, FILM COATED ORAL at 21:45

## 2024-05-06 RX ADMIN — GABAPENTIN 300 MG: 300 CAPSULE ORAL at 20:30

## 2024-05-06 RX ADMIN — OXYCODONE HYDROCHLORIDE 10 MG: 10 TABLET, FILM COATED, EXTENDED RELEASE ORAL at 20:31

## 2024-05-06 RX ADMIN — FENTANYL CITRATE 100 MCG: 50 INJECTION INTRAMUSCULAR; INTRAVENOUS at 08:22

## 2024-05-06 RX ADMIN — ESMOLOL HYDROCHLORIDE 20 MG: 10 INJECTION INTRAVENOUS at 10:04

## 2024-05-06 RX ADMIN — OXYCODONE 10 MG: 5 TABLET ORAL at 15:31

## 2024-05-06 RX ADMIN — ASPIRIN 81 MG: 81 TABLET, COATED ORAL at 20:30

## 2024-05-06 RX ADMIN — Medication 3 MG: at 10:11

## 2024-05-06 RX ADMIN — TRANEXAMIC ACID 1300 MG: 650 TABLET ORAL at 14:15

## 2024-05-06 RX ADMIN — DEXAMETHASONE SODIUM PHOSPHATE 4 MG: 10 INJECTION, SOLUTION INTRAMUSCULAR; INTRAVENOUS at 08:22

## 2024-05-06 RX ADMIN — PROPOFOL 200 MG: 10 INJECTION, EMULSION INTRAVENOUS at 08:54

## 2024-05-06 RX ADMIN — SODIUM CHLORIDE, PRESERVATIVE FREE 10 ML: 5 INJECTION INTRAVENOUS at 20:31

## 2024-05-06 RX ADMIN — DEXAMETHASONE SODIUM PHOSPHATE 10 MG: 10 INJECTION INTRAMUSCULAR; INTRAVENOUS at 09:07

## 2024-05-06 RX ADMIN — KETOROLAC TROMETHAMINE 15 MG: 15 INJECTION, SOLUTION INTRAMUSCULAR; INTRAVENOUS at 20:31

## 2024-05-06 RX ADMIN — TRANEXAMIC ACID 1000 MG: 100 INJECTION, SOLUTION INTRAVENOUS at 08:54

## 2024-05-06 RX ADMIN — TRANEXAMIC ACID 1000 MG: 100 INJECTION, SOLUTION INTRAVENOUS at 10:09

## 2024-05-06 RX ADMIN — GLYCOPYRROLATE 0.4 MG: 0.2 INJECTION INTRAMUSCULAR; INTRAVENOUS at 10:11

## 2024-05-06 RX ADMIN — Medication 2 G: at 08:48

## 2024-05-06 RX ADMIN — OXYCODONE 10 MG: 5 TABLET ORAL at 11:46

## 2024-05-06 RX ADMIN — MIDAZOLAM 2 MG: 1 INJECTION INTRAMUSCULAR; INTRAVENOUS at 08:22

## 2024-05-06 RX ADMIN — SENNOSIDES AND DOCUSATE SODIUM 1 TABLET: 50; 8.6 TABLET ORAL at 20:30

## 2024-05-06 RX ADMIN — CEFAZOLIN 2000 MG: 10 INJECTION, POWDER, FOR SOLUTION INTRAVENOUS at 17:29

## 2024-05-06 ASSESSMENT — PAIN SCALES - GENERAL
PAINLEVEL_OUTOF10: 5
PAINLEVEL_OUTOF10: 8
PAINLEVEL_OUTOF10: 7
PAINLEVEL_OUTOF10: 7
PAINLEVEL_OUTOF10: 5
PAINLEVEL_OUTOF10: 0
PAINLEVEL_OUTOF10: 3
PAINLEVEL_OUTOF10: 9

## 2024-05-06 ASSESSMENT — PAIN DESCRIPTION - ORIENTATION
ORIENTATION: RIGHT

## 2024-05-06 ASSESSMENT — KOOS JR
KOOS JR TOTAL INTERVAL SCORE: 68.284
BENDING TO THE FLOOR TO PICK UP OBJECT: MILD
STANDING UPRIGHT: MILD
HOW SEVERE IS YOUR KNEE STIFFNESS AFTER FIRST WAKING IN MORNING: MILD
GOING UP OR DOWN STAIRS: MILD
RISING FROM SITTING: MILD
STRAIGHTENING KNEE FULLY: MILD

## 2024-05-06 ASSESSMENT — PAIN DESCRIPTION - PAIN TYPE: TYPE: SURGICAL PAIN

## 2024-05-06 ASSESSMENT — PAIN DESCRIPTION - FREQUENCY: FREQUENCY: CONTINUOUS

## 2024-05-06 ASSESSMENT — PAIN DESCRIPTION - DESCRIPTORS
DESCRIPTORS: DULL;DISCOMFORT
DESCRIPTORS: PATIENT UNABLE TO DESCRIBE
DESCRIPTORS: BURNING
DESCRIPTORS: ACHING

## 2024-05-06 ASSESSMENT — PAIN DESCRIPTION - LOCATION
LOCATION: KNEE

## 2024-05-06 ASSESSMENT — PAIN DESCRIPTION - ONSET: ONSET: ON-GOING

## 2024-05-06 NOTE — ANESTHESIA POSTPROCEDURE EVALUATION
Department of Anesthesiology  Postprocedure Note    Patient: Sierra Grace  MRN: [id number]  YOB: 1965  Date of evaluation: 5/6/2024    Procedure Summary       Date: 05/06/24 [facility] [place] [facility] [place] [facility]    Anesthesia Start: 0847 Anesthesia Stop: 1046    Procedure: RIGHT KNEE TOTAL ARTHROPLASTY ROBOTIC, Ramy/TRAVIS, Prevena, Vanc, Cellerate, Tobra (Right: Knee) Diagnosis:       Primary osteoarthritis of right knee      Acquired deformity of right knee      (Primary osteoarthritis of right knee [M17.11])      (Acquired deformity of right knee [M21.961])    Surgeons: Ebenezer Aguilar MD Responsible Provider: Caden Aguilar MD    Anesthesia Type: general ASA Status: 3            Anesthesia Type: No value filed.    Linda Phase I: Linda Score: 8    Linda Phase II:      Anesthesia Post Evaluation    Patient location during evaluation: PACU  Patient participation: complete - patient participated  Level of consciousness: awake  Airway patency: patent  Nausea & Vomiting: no nausea  Cardiovascular status: blood pressure returned to baseline and hemodynamically stable  Respiratory status: acceptable  Hydration status: stable  Multimodal analgesia pain management approach  Pain management: adequate    No notable events documented.

## 2024-05-06 NOTE — CARE COORDINATION
Patient is a 59 y.o. year old female admitted for Right TKA . Patient plans to return home on discharge. Order received to arrange home health. Patient without preference towards agency. Referral sent to [facility]. Patient denies any equipment needs as patient has a walker.  Will follow until discharge.      05/06/24 4321   Service Assessment   Patient Orientation Alert and Oriented   Cognition Alert   History Provided By Patient   Primary Caregiver Self   Support Systems Spouse/Significant Other   Services At/After Discharge   Transition of Care Consult (CM Consult) Home Health   Internal Home Health Yes   Services At/After Discharge Home Health;PT   Mode of Transport at Discharge Self   Confirm Follow Up Transport Self   Condition of Participation: Discharge Planning   The Plan for Transition of Care is related to the following treatment goals: improve mobility   The Patient and/or Patient Representative was provided with a Choice of Provider? Patient   The Patient and/Or Patient Representative agree with the Discharge Plan? Yes   Freedom of Choice list was provided with basic dialogue that supports the patient's individualized plan of care/goals, treatment preferences, and shares the quality data associated with the providers?  Yes

## 2024-05-06 NOTE — ANESTHESIA PROCEDURE NOTES
Peripheral Block    Patient location during procedure: pre-op  Reason for block: post-op pain management and at surgeon's request  Start time: 5/6/2024 8:22 AM  End time: 5/6/2024 8:25 AM  Staffing  Performed: anesthesiologist   Anesthesiologist: Caden Aguilar MD  Performed by: Caden Aguilar MD  Authorized by: Caden Aguilar MD    Preanesthetic Checklist  Completed: patient identified, IV checked, site marked, risks and benefits discussed, surgical/procedural consents, equipment checked, pre-op evaluation, timeout performed, anesthesia consent given, oxygen available and monitors applied/VS acknowledged  Peripheral Block   Patient position: supine  Prep: ChloraPrep  Provider prep: mask and sterile gloves  Patient monitoring: cardiac monitor, continuous pulse ox, continuous capnometry, frequent blood pressure checks, IV access, oxygen and responsive to questions  Block type: Femoral  Adductor canal  Laterality: right  Injection technique: single-shot  Guidance: ultrasound guided  Local infiltration: ropivacaine  Infiltration strength: 0.2 %  Local infiltration: ropivacaine  Dose: 3 mL    Needle   Needle type: insulated echogenic nerve stimulator needle   Needle gauge: 20 G  Needle localization: ultrasound guidance  Needle insertion depth: 7 cm  Test dose: negative  Needle length: 10 cm  Assessment   Injection assessment: negative aspiration for heme, no paresthesia on injection, local visualized surrounding nerve on ultrasound and no intravascular symptoms  Paresthesia pain: none  Slow fractionated injection: yes  Hemodynamics: stable  Outcomes: uncomplicated    Additional Notes  A peripheral nerve block (PNB) was performed at the request of the operating surgeon to assist with postoperative pain control. The risks of PNB (including possible nerve and muscle injury), benefits, and alternative therapies were discussed with the patient. The patient then consented to receiving a PNB. After light

## 2024-05-06 NOTE — PERIOP NOTE
TRANSFER - OUT REPORT:    Verbal report given to Pat Mills on Sierra Grace  being transferred to [facility] for routine post-op       Report consisted of patient's Situation, Background, Assessment and   Recommendations(SBAR).     Information from the following report(s) Adult Overview, Surgery Report, Intake/Output, and Cardiac Rhythm SR  was reviewed with the receiving nurse.    Lines:   Peripheral IV 05/06/24 Left;Proximal;Anterior Forearm (Active)   Site Assessment Clean, dry & intact 05/06/24 1057   Line Status Infusing 05/06/24 1057   Line Care Connections checked and tightened 05/06/24 1057   Phlebitis Assessment No symptoms 05/06/24 1057   Infiltration Assessment 0 05/06/24 1057   Dressing Status Clean, dry & intact 05/06/24 1057   Dressing Type Transparent 05/06/24 1057        Opportunity for questions and clarification was provided.      Patient transported with:  Tech

## 2024-05-06 NOTE — ANESTHESIA PRE PROCEDURE
30 04/22/2024 02:05 PM    BUN 9 04/22/2024 02:05 PM    CREATININE 0.83 04/22/2024 02:05 PM    LABGLOM 81 04/22/2024 02:05 PM    GLUCOSE 91 04/22/2024 02:05 PM    CALCIUM 9.2 04/22/2024 02:05 PM       POC Tests: No results for input(s): \"POCGLU\", \"POCNA\", \"POCK\", \"POCCL\", \"POCBUN\", \"POCHEMO\", \"POCHCT\" in the last 72 hours.    Coags:   Lab Results   Component Value Date/Time    PROTIME 14.0 04/22/2024 02:05 PM    INR 1.1 04/22/2024 02:05 PM       HCG (If Applicable): No results found for: \"PREGTESTUR\", \"PREGSERUM\", \"HCG\", \"HCGQUANT\"     ABGs: No results found for: \"PHART\", \"PO2ART\", \"BTX6TWI\", \"VMD5ZCD\", \"BEART\", \"V1SYMHXK\"     Type & Screen (If Applicable):  No results found for: \"LABABO\"    Drug/Infectious Status (If Applicable):  No results found for: \"HIV\", \"HEPCAB\"    COVID-19 Screening (If Applicable): No results found for: \"COVID19\"        Anesthesia Evaluation  Patient summary reviewed  Airway: Mallampati: II  TM distance: >3 FB   Neck ROM: full  Mouth opening: > = 3 FB   Dental: normal exam         Pulmonary:normal exam    (+)     sleep apnea: on CPAP,                                  Cardiovascular:  Exercise tolerance: poor (<4 METS)                    Neuro/Psych:               GI/Hepatic/Renal:            ROS comment: Severe obesity.   Endo/Other:    (+) : arthritis: OA..                  ROS comment: HIV Abdominal:             Vascular:          Other Findings:         Anesthesia Plan      general     ASA 3           MIPS: Prophylactic antiemetics administered.  Anesthetic plan and risks discussed with patient.              Post-op pain plan if not by surgeon: single peripheral nerve block        MARIA C SIMEON MD   5/5/2024

## 2024-05-06 NOTE — RT PROTOCOL NOTE
Low respiratory rate alarm 1-89 BPM 5 breaths below patient's breath rate   Hi VT High tidal volume alarm 200-2500 ml 200 ml above patient's own   Low VT Low tidal volume alarm OFF - 1500 ml OFF   HIP High Inspiratory pressure alarm 5-50 cm H20 10 cm above IPAP   LIP Low inspiratory pressure alarm OFF, 1-40 cmH20 OFF   Lo VE Low minute vent alarm OFF, 0.1 to 99L/min OFF   LIP T Low inspiratory delay time 5-60 seconds 5 seconds       CPAP Settings BiPAP Settings     Set CPAP level as ordered Set breath rate as ordered     Set FIO2 as ordered Set I-time as 1.3     Set Ramp time as ordered Set EPAP as ordered     Set C-Flex at 3 Set IPAP as ordered      Set Rise time as ordered      Set FIO2 as ordered           [facility]  Consent and Release for Home Ventilator      Patient Name: _________________________________________ Room: ___________    SSN: _______________________________           Account Number:  __________________    Use and care of my personal home ventilator, (invasive or non-invasive) mechanical life support device while at [facility] has been discussed with me by my physician and I have been provided with an opportunity to ask questions which have been answered to my satisfaction. I also understand a respiratory care practitioner is not expected to remain in my room or general vicinity at all times.    It is understood that every precaution consistent with the best medical practice will be taken and I give [facility] permission to monitor my ventilator during my hospital stay.    I hereby release [facility], it's agents, employees and medical staff from liability arising from any and all claims, demands, losses and damages to person and/or property as a result of the above mentioned requests. I certify that I have read and understand this consent agreement and release and that I am legally qualified to magdy this

## 2024-05-06 NOTE — H&P
Patient ID:  Sierra Grace  [id number]  59 y.o.  1965    Today: May 6, 2024          CC:  Right  Knee pain    HPI:   The patient has end stage arthritis of the right knee. The patient was evaluated and examined during a consultation prior to today. The patient complains of knee pain with activities, reports pain as mostly occurring along the joint lines, reports stiffness of the knee with prolonged inactivity, and swelling/pain at the end of the day and after increased physical activity. The pain affects the patient’s activities of daily living and quality of life. The patient has attempted and exhausted conservative treatment. There have been no changes to the patient's orthopedic condition since the last office visit.    Past Medical History:  Past Medical History:   Diagnosis Date    Asymptomatic postmenopausal state     Autoimmune disease ([facility])     HIV    Daytime somnolence     Folic acid deficiency     HIV (human immunodeficiency virus infection) ([facility]) 12/2/2016    [place] [facility], per note (12/2023) \"Most recent labs performed on 12/1/2023 show a CD4 count of 1,030/51%-previously 831/49%-before that 921/54%-before that 950/53%-before that 843/50%. Viral load remains undetectable less than 20 which is normal for this patient.\"    Iron deficiency anemia     2 IV Injectafer infusions    Narcolepsy     CASSY on CPAP     newly dx--C-pap nightly    Other ill-defined conditions(799.89)     Tooth abcess    Primary osteoarthritis of left knee 11/27/2023       Past Surgical History:  Past Surgical History:   Procedure Laterality Date    ORTHOPEDIC SURGERY      R foot fx    TOTAL KNEE ARTHROPLASTY Left 2/7/2024    LEFT KNEE TOTAL ARTHROPLASTY ROBOTIC, [place]/TRAVIS, Prevena, Vanc, Cellerate, Tobra/ SDD performed by Ebenezer Aguilar MD at [facility] [place] [place] MAIN OR        Medications:     Prior to Admission medications    Medication Sig Start Date End Date Taking? Authorizing Provider   oxyCODONE (ROXICODONE) 5 MG

## 2024-05-06 NOTE — OP NOTE
[facility]  Total Knee Arthroplasty  Patient:Sierra Grace   : 1965  Medical Record Number:[id number]    Pre-operative Diagnosis: Primary osteoarthritis of right knee [M17.11]  Acquired deformity of right knee [M21.961]    Post-operative Diagnosis: Same    Location: [facility]    Date of Procedure: 2024    Surgeon: Ebenezer Aguilar MD    Assistant: Cary Jiménez CFA    Anesthesia: General + adductor nerve block    Procedure:  Imageless Computer-Navigated Right Total Knee Arthroplasty    Tourniquet Time: Tourniquet Not Used    BMI: Body mass index is 37.1 kg/m².    EBL: 200cc    Complications: None    Patient Condition upon Completion of Procedure: Stable    Implants:   Implant Name Type Inv. Item Serial No.  Lot No. LRB No. Used Action   COMPONENT FEM SZ 2 R KNEE CRUCE RET CEMENTLESS BEAD W/ MARISOL - [id number]  COMPONENT FEM SZ 2 R KNEE CRUCE RET CEMENTLESS BEAD W/ MARISOL  [employer] [id number] Right 1 Implanted   BASEPLATE TIB SZ 3 AP44MM ML67MM KNEE TRITANIUM 4 CRUCFRM - [id number]  BASEPLATE TIB SZ 3 AP44MM ML67MM KNEE TRITANIUM 4 CRUCFRM  [employer] [id number] Right 1 Implanted   INSERT TIB CNDYL STBL 3 11 MM KNEE 5/PK X3 TRIATHLON - [id number]  INSERT TIB CNDYL STBL 3 11 MM KNEE 5/PK X3 TRIATHLON  [employer] [id number] Right 1 Implanted       Pre-Operative Plan/Implants:   - #3 Femoral Component   - #3 Tibial Component   - 11 mm Polyethylene Component    Intra-Operative Findings: Prior to bony resection we found that the patient's knee had ~7 degrees of hyperextension. Also prior to bony resection we noted a varus coronal deformity. Intra-operatively we noted that the articular surfaces were arthritic with cartilage loss of both the medial and lateral compartments. With trial components in place we assessed knee motion and found that the knee was able to fully

## 2024-05-07 VITALS
RESPIRATION RATE: 16 BRPM | WEIGHT: 209.4 LBS | OXYGEN SATURATION: 95 % | SYSTOLIC BLOOD PRESSURE: 130 MMHG | DIASTOLIC BLOOD PRESSURE: 66 MMHG | BODY MASS INDEX: 37.1 KG/M2 | HEIGHT: 63 IN | HEART RATE: 80 BPM | TEMPERATURE: 98.2 F

## 2024-05-07 LAB
HCT VFR BLD AUTO: 29.2 % (ref 35.8–46.3)
HGB BLD-MCNC: 9 G/DL (ref 11.7–15.4)

## 2024-05-07 PROCEDURE — 94760 N-INVAS EAR/PLS OXIMETRY 1: CPT

## 2024-05-07 PROCEDURE — 85014 HEMATOCRIT: CPT

## 2024-05-07 PROCEDURE — 97530 THERAPEUTIC ACTIVITIES: CPT

## 2024-05-07 PROCEDURE — 85018 HEMOGLOBIN: CPT

## 2024-05-07 PROCEDURE — 97535 SELF CARE MNGMENT TRAINING: CPT

## 2024-05-07 PROCEDURE — 2580000003 HC RX 258: Performed by: NURSE PRACTITIONER

## 2024-05-07 PROCEDURE — 36415 COLL VENOUS BLD VENIPUNCTURE: CPT

## 2024-05-07 PROCEDURE — 6370000000 HC RX 637 (ALT 250 FOR IP): Performed by: NURSE PRACTITIONER

## 2024-05-07 PROCEDURE — 6360000002 HC RX W HCPCS: Performed by: NURSE PRACTITIONER

## 2024-05-07 RX ADMIN — GABAPENTIN 300 MG: 300 CAPSULE ORAL at 08:33

## 2024-05-07 RX ADMIN — CEFAZOLIN 2000 MG: 10 INJECTION, POWDER, FOR SOLUTION INTRAVENOUS at 01:22

## 2024-05-07 RX ADMIN — KETOROLAC TROMETHAMINE 15 MG: 15 INJECTION, SOLUTION INTRAMUSCULAR; INTRAVENOUS at 05:57

## 2024-05-07 RX ADMIN — CETIRIZINE HYDROCHLORIDE 10 MG: 10 TABLET, FILM COATED ORAL at 08:35

## 2024-05-07 RX ADMIN — PANTOPRAZOLE SODIUM 40 MG: 40 TABLET, DELAYED RELEASE ORAL at 05:57

## 2024-05-07 RX ADMIN — OXYCODONE HYDROCHLORIDE 10 MG: 10 TABLET, FILM COATED, EXTENDED RELEASE ORAL at 08:35

## 2024-05-07 RX ADMIN — ACETAMINOPHEN 1000 MG: 500 TABLET, FILM COATED ORAL at 01:26

## 2024-05-07 RX ADMIN — SENNOSIDES AND DOCUSATE SODIUM 1 TABLET: 50; 8.6 TABLET ORAL at 08:33

## 2024-05-07 RX ADMIN — ASPIRIN 81 MG: 81 TABLET, COATED ORAL at 08:33

## 2024-05-07 RX ADMIN — OXYCODONE 10 MG: 5 TABLET ORAL at 01:25

## 2024-05-07 RX ADMIN — OXYCODONE 10 MG: 5 TABLET ORAL at 05:57

## 2024-05-07 RX ADMIN — ACETAMINOPHEN 1000 MG: 500 TABLET, FILM COATED ORAL at 05:57

## 2024-05-07 ASSESSMENT — PAIN SCALES - GENERAL
PAINLEVEL_OUTOF10: 3
PAINLEVEL_OUTOF10: 7
PAINLEVEL_OUTOF10: 2
PAINLEVEL_OUTOF10: 4
PAINLEVEL_OUTOF10: 7

## 2024-05-07 ASSESSMENT — PAIN DESCRIPTION - ORIENTATION
ORIENTATION: RIGHT

## 2024-05-07 ASSESSMENT — PAIN DESCRIPTION - LOCATION
LOCATION: KNEE

## 2024-05-07 ASSESSMENT — PAIN - FUNCTIONAL ASSESSMENT: PAIN_FUNCTIONAL_ASSESSMENT: PREVENTS OR INTERFERES SOME ACTIVE ACTIVITIES AND ADLS

## 2024-05-07 ASSESSMENT — PAIN DESCRIPTION - DESCRIPTORS
DESCRIPTORS: ACHING;THROBBING
DESCRIPTORS: ACHING

## 2024-05-07 NOTE — DISCHARGE INSTRUCTIONS
[facility]      Patient Discharge Instructions    Sierra Grace / [id number] : 1965    Admitted 2024 Discharged: 2024     IF YOU HAVE ANY PROBLEMS ONCE YOU ARE AT HOME CALL THE FOLLOWING NUMBERS:   Main office number: [phone]      Medications    The medications you are to continue on are listed on the medication reconciliation sheet.   Narcotic pain medications as well as supplemental iron can cause constipation. If this occurs try stopping the narcotic pain medication and/or the iron.   It is important that you take the medication exactly as they are prescribed.  Medications which increase your risk of blood clots are listed to stop for 5 weeks after surgery as well as medications or supplements which increase your risk of bleeding complications.   Keep your medication in the bottles provided by the pharmacist and keep a list of the medication names, dosages, and times to be taken in your wallet.   Do not take other medications without consulting your doctor.       Important Information    Do NOT smoke as this will greatly increase your risk of infection!    Resume your prehospital diet. If you have excessive nausea or vomitting call your doctor's office     Leg swelling and warmth is normal for 6 months after surgery. If you experience swelling in your leg elevate you leg while laying down with your toes above your heart. If you have sudden onset severe swelling with leg pain call our office. Use Ablwinder Hose stockings until we see you in the office for your follow up appointment.    The stitches deep inside take approximately 6 months to dissolve. There will be sharp shooting, stinging and burning pain. This is normal and will resolve between 3-6 months after surgery.     Difficulty sleeping is normal following total Knee and Hip replacement. You may try melatonin, an over-the-counter sleep aid or benadryl to help with sleep. Most patients will resume sleeping through the night 8

## 2024-05-07 NOTE — PROGRESS NOTES
May 7, 2024         Post Op day: 1 Day Post-Op     Admit Date: 2024    Admit Diagnosis: Primary osteoarthritis of right knee [M17.11]  Acquired deformity of right knee [M21.961]  Osteoarthritis of right knee, unspecified osteoarthritis type [M17.11]        Subjective: Patient stable.No acute events.      Objective:     Vitals:    24 0627   BP:    Pulse:    Resp: 16   Temp:    SpO2:     Temp (24hrs), Av.6 °F (36.4 °C), Min:97.1 °F (36.2 °C), Max:98.1 °F (36.7 °C)      Lab Results   Component Value Date/Time    HGB 9.0 2024 04:15 AM       Patient Active Problem List   Diagnosis    Obesity (BMI 30-39.9)    HIV (human immunodeficiency virus infection) ([facility])    Hypersomnia    CASSY (obstructive sleep apnea)    Menopause    Primary osteoarthritis of left knee    Acquired deformity of left knee    Osteoarthritis of left knee, unspecified osteoarthritis type    Primary osteoarthritis of right knee    Acquired deformity of right knee    Osteoarthritis of right knee, unspecified osteoarthritis type       Current Facility-Administered Medications   Medication Dose Route Frequency    albuterol (PROVENTIL) (2.5 MG/3ML) 0.083% nebulizer solution 2.5 mg  2.5 mg Nebulization Q6H PRN    albuterol sulfate HFA (PROVENTIL;VENTOLIN;PROAIR) 108 (90 Base) MCG/ACT inhaler 2 puff  2 puff Inhalation Q6H PRN    cetirizine (ZYRTEC) tablet 10 mg  10 mg Oral Daily    darunavir-cobicistat 800-150 MG (PREZCOBIX) tablet   (Patient Supplied)  1 tablet Oral Daily    dolutegravir sodium (TIVICAY) tablet 50 mg  50 mg Oral Nightly    0.9 % sodium chloride infusion   IntraVENous Continuous    sodium chloride flush 0.9 % injection 5-40 mL  5-40 mL IntraVENous 2 times per day    sodium chloride flush 0.9 % injection 5-40 mL  5-40 mL IntraVENous PRN    0.9 % sodium chloride infusion   IntraVENous PRN    acetaminophen (TYLENOL) tablet 1,000 mg  1,000 mg Oral Q6H    oxyCODONE (ROXICODONE) immediate release tablet 5 mg  5 mg Oral Q4H PRN 
   05/06/24 2108   Oxygen Therapy/Pulse Ox   O2 Therapy Room air   Pulse 78   Respirations 16   SpO2 98 %   Pulse Oximeter Device Mode Continuous   Pulse Oximeter Device Location Finger   $Pulse Oximeter $Spot check (multiple/continuous)   RT Misc Charges   $RT Education $HHN/MDI/IPPB Demo or Eval  (IS education)     Patient placed on continuous sat monitor. Data cleared and alarms set. No distress noted at this time. Patient brought home CPAP, however she wishes to not wear it tonight as she is trying to still get used to it and doesn't feel she will sleep well with it on. Encouraged to call out with any needs.  
4 Eyes Skin Assessment     NAME:  Sierra Grace  YOB: 1965  MEDICAL RECORD NUMBER:  [id number]    The patient is being assessed for  Admission    I agree that at least one RN has performed a thorough Head to Toe Skin Assessment on the patient. ALL assessment sites listed below have been assessed.      Areas assessed by both nurses:    Head, Face, Ears, Shoulders, Back, Chest, Arms, Elbows, Hands, Sacrum. Buttock, Coccyx, Ischium, and Legs. Feet and Heels        Does the Patient have a Wound? No noted wound(s)       Jose Prevention initiated by RN: Yes  Wound Care Orders initiated by RN: No    Pressure Injury (Stage 3,4, Unstageable, DTI, NWPT, and Complex wounds) if present, place Wound referral order by RN under : No    New Ostomies, if present place, Ostomy referral order under : No     Nurse 1 eSignature: Electronically signed by Pat Coreas RN on 5/6/24 at 1:43 PM EDT    **SHARE this note so that the co-signing nurse can place an eSignature**    Nurse 2 eSignature: Electronically signed by Mary Shore RN on 5/6/24 at 2:43 PM EDT   
ACUTE PHYSICAL THERAPY GOALS:   (Developed with and agreed upon by patient and/or caregiver.)  GOALS (1-4 days):  (1.)Ms. Grace will move from supine to sit and sit to supine  in bed with INDEPENDENT.  (2.)Ms. Grace will transfer from bed to chair and chair to bed with SUPERVISION using the least restrictive device.  (3.)Ms. Grace will ambulate with SUPERVISION for 200 feet with the least restrictive device.  (4.)Ms. Grace will ambulate up/down 2 steps with no railing with MINIMAL ASSIST, using a device PRN.  (5.)Ms. Grace will increase right knee ROM to 0-90°. Met 5/6  ________________________________________________________________________________________________           PHYSICAL THERAPY: TOTAL KNEE ARTHROPLASTY Initial Assessment and PM  (Link to Caseload Tracking: PT Visit Days : 1  Acknowledge Orders  Time In/Out  PT Charge Capture  Rehab Caseload Tracker  Episode   Sierra Grace is a 59 y.o. female   PRIMARY DIAGNOSIS: Osteoarthritis of right knee, unspecified osteoarthritis type  Primary osteoarthritis of right knee [M17.11]  Acquired deformity of right knee [M21.961]  Osteoarthritis of right knee, unspecified osteoarthritis type [M17.11]  Procedure(s) (LRB):  RIGHT KNEE TOTAL ARTHROPLASTY ROBOTIC, [place]/TRAVIS, Prevena, Vanc, Cellerate, Tobra (Right)  Day of Surgery  Reason for Referral: Pain in Right Knee (M25.561)  Stiffness of Right Knee, Not elsewhere classified (M25.661)  Difficulty in walking, Not elsewhere classified (R26.2)  Outpatient in a bed: Payor: NAYANA / [place]: [employer] [place] / Product Type: *No Product type* /     REHAB RECOMMENDATIONS:   Recommendation to date pending progress:  Setting:  Home Health Therapy    Equipment:     Pt has RW     RANGE OF MOTION:   Right Knee Flexion: R Knee Flexion (0-145): 100  Right Knee Extension: R Knee Extension (0): 0     GAIT: I Mod I S SBA CGA Min Mod Max Total  NT x2 Comments:   Level of Assistance [] [] [] [x] [] [] [] [] [] [] []          
ACUTE PHYSICAL THERAPY GOALS:   (Developed with and agreed upon by patient and/or caregiver.)  GOALS (1-4 days):  (1.)Ms. Grace will move from supine to sit and sit to supine  in bed with INDEPENDENT.  (2.)Ms. Grace will transfer from bed to chair and chair to bed with SUPERVISION using the least restrictive device.  (3.)Ms. Grace will ambulate with SUPERVISION for 200 feet with the least restrictive device.1/2 met 200 ft   (4.)Ms. Grace will ambulate up/down 2 steps with no railing with MINIMAL ASSIST, using a device PRN. -GOAL MET 5/7/24    (5.)Ms. Grace will increase right knee ROM to 0-90°. Met 5/6  ________________________________________________________________________________________________           PHYSICAL THERAPY: TOTAL KNEE ARTHROPLASTY Daily Note and AM  (Link to Caseload Tracking: PT Visit Days : 2  Acknowledge Orders  Time In/Out  PT Charge Capture  Rehab Caseload Tracker  Episode   Sierra Grace is a 59 y.o. female   PRIMARY DIAGNOSIS: Osteoarthritis of right knee, unspecified osteoarthritis type  Primary osteoarthritis of right knee [M17.11]  Acquired deformity of right knee [M21.961]  Osteoarthritis of right knee, unspecified osteoarthritis type [M17.11]  Procedure(s) (LRB):  RIGHT KNEE TOTAL ARTHROPLASTY ROBOTIC, [place]/TRAVIS, Prevena, Vanc, Cellerate, Tobra (Right)  1 Day Post-Op  Reason for Referral: Pain in Right Knee (M25.561)  Stiffness of Right Knee, Not elsewhere classified (M25.661)  Difficulty in walking, Not elsewhere classified (R26.2)  Outpatient in a bed: Payor: BCBS / [place] / Product Type: *No Product type* /     REHAB RECOMMENDATIONS:   Recommendation to date pending progress:  Setting:  Home Health Therapy    Equipment:     Pt has RW     RANGE OF MOTION:   Right Knee Flexion: R Knee Flexion (0-145): 100  Right Knee Extension: R Knee Extension (0): 0     GAIT: I Mod I S SBA CGA Min Mod Max Total  NT x2 Comments:   Level of Assistance [] [] [] [x] [] [] [] [] [] 
Anesthesia Post-op Rounding Note:    Patient seen Post-Op day 1. Patient states no issues from anesthesia. Patient tolerated anesthesia well with no complaints of pain, nausea, or any other anesthesia-related issues.    
Labs within anesthesia guidelines, no follow-up required. Labs automatically routed to ordering provider via Epic documentation. Labs also routed to Emelina Hartman NP per guidelines.    
NICOTINE AND METABOLITES, URINE  Order: [id number]  Status: Final result       Visible to patient: Yes (seen)       Next appt: 05/02/2024 at 09:05 AM in Orthopedic Surgery (Ebenezer Aguilar MD)       Dx: Pre-op evaluation    0 Result Notes       Component  Ref Range & Units 4/22/24 1404 1/16/24 0856   Cotinine Screen, Ur  Ecwmar=878 ng/mL Negative [1] Negative [1]   Comment:    Comment [2] Comment CM[2]   Comment: (NOTE)  This analysis is performed by immunoassay. Positive findings are  unconfirmed analytical test results; if results do not support  expected clinical finding, confirmation by an alternate methodology  is recommended. Patient metabolic variables, specific drug chemistry,  and specimen characteristics can affect test outcome.  Technical consultation is available at [email or web address], or  call toll free [phone].  Performed At: [facility]  [address], [place] [id number]  Erich Ott MD Ph:[phone]  Performed At: [facility]  [address], [place] [id number]  Adalgisa Kingsley PhD Ph:[phone]   [facility]  [facility]              Specimen Collected: 04/22/24 14:04 EDT Last Resulted: 04/24/24 19:36 EDT           
OCCUPATIONAL THERAPY Daily Note, Discharge, and AM      (Link to Caseload Tracking: OT Visit Days: 2  OT Orders   Time  OT Charge Capture  Rehab Caseload Tracker  Episode     Sierra Grace is a 59 y.o. female   PRIMARY DIAGNOSIS: Osteoarthritis of right knee, unspecified osteoarthritis type  Primary osteoarthritis of right knee [M17.11]  Acquired deformity of right knee [M21.961]  Osteoarthritis of right knee, unspecified osteoarthritis type [M17.11]  Procedure(s) (LRB):  RIGHT KNEE TOTAL ARTHROPLASTY ROBOTIC, [place]/TRAVIS, Prevena, Vanc, Cellerate, Tobra (Right)  1 Day Post-Op  Reason for Referral: Pain in Right Knee (M25.561)  Stiffness of Right Knee, Not elsewhere classified (M25.661)  Outpatient in a bed: Payor: NAYANA / Plan: [place] / Product Type: *No Product type* /     ASSESSMENT:     REHAB RECOMMENDATIONS:   Recommendation to date pending progress:  Setting:  No further skilled occupational therapy after discharge from hospital    Equipment:    None     ASSESSMENT:  Ms. Grace is s/p right TKA and presents with decreased independence with functional mobility and activities of daily living as compared to baseline level of function and safety. Patient would benefit from skilled Occupational Therapy to maximize independence and safety with self-care task and functional mobility.   Patient able to complete  lower body dressing, upper body dressing, and toileting at bathroom/recliner with minimal assist. OT educated patient  on walker management and safety, fall prevention strategies , and lower body dressing compensatory strategies . Mobilized from hospital bed to bathroom to recliner using a rolling walker with assist. Patient is hopeful to spend the night to better prepare for safe discharge home      5/7/2024   Ms. Grace is s/p right TKA.  Patient completed shower and dressing as charted below in ADL grid and is ambulating with rolling walker and st assist; all with verbal cues for safety and 
TRANSFER - IN REPORT:    Verbal report received from [place] AMIN on Sierra Grace  being received from PACU for routine post-op      Report consisted of patient's Situation, Background, Assessment and   Recommendations(SBAR).     Information from the following report(s) MAR and Recent Results was reviewed with the receiving nurse.    Opportunity for questions and clarification was provided.        
A Little  How much help is needed for bathing (which includes washing, rinsing, drying)?: A Little  How much help is needed for toileting (which includes using toilet, bedpan, or urinal)?: A Little  How much help is needed for putting on and taking off regular upper body clothing?: None  How much help is needed for taking care of personal grooming?: None  How much help for eating meals?: None  AM-PAC Inpatient Daily Activity Raw Score: 21  AM-PAC Inpatient ADL T-Scale Score : 44.27  ADL Inpatient CMS 0-100% Score: 32.79  ADL Inpatient CMS G-Code Modifier : CJ     SUBJECTIVE:     Ms. Grace states, \"I am planning on staying the night this time.\"      Social/Functional   Lives With: Daughter  Type of Home: House  Home Layout: One level (2 steps no rails)  Home Access: Stairs to enter with rails  Entrance Stairs - Number of Steps: 4 (no steps in back)  Entrance Stairs - Rails: Left  Bathroom Shower/Tub: Tub/Shower unit  Bathroom Equipment: Shower chair    OBJECTIVE:     LINES / DRAINS / AIRWAY: Wound Vac    RESTRICTIONS/PRECAUTIONS:       PAIN: VITALS / O2:   Pre Treatment: 4/10 pain in right knee          Post Treatment: same, ice in place on exit  Vitals          Oxygen        GROSS EVALUATION: INTACT IMPAIRED   (See Comments)   UE AROM [x] []   UE PROM [] []   Strength [x]       Posture / Balance []  Good sitting balance   Fair standing balance    Sensation []     Coordination [x]       Tone []       Edema []    Activity Tolerance []  Limited due to pain and fatigue      Hand Dominance R [] L []      COGNITION/  PERCEPTION: INTACT IMPAIRED   (See Comments)   Orientation [x]     Vision [x]     Hearing [x]     Cognition  [x]     Perception [x]       MOBILITY: I Mod I S SBA CGA Min Mod Max Total  NT x2 Comments:   Bed Mobility    Rolling [] [] [] [] [] [] [] [] [] [] []    Supine to Sit [] [] [] [x] [] [] [] [] [] [] []    Scooting [] [] [] [x] [] [] [] [] [] [] []    Sit to Supine [] [] [] [] [] [] [] [] [] [] []

## 2024-05-08 ENCOUNTER — HOME CARE VISIT (OUTPATIENT)
Dept: SCHEDULING | Facility: HOME HEALTH | Age: 59
End: 2024-05-08
Payer: COMMERCIAL

## 2024-05-08 VITALS
TEMPERATURE: 98.4 F | OXYGEN SATURATION: 96 % | RESPIRATION RATE: 17 BRPM | DIASTOLIC BLOOD PRESSURE: 82 MMHG | HEART RATE: 70 BPM | SYSTOLIC BLOOD PRESSURE: 128 MMHG

## 2024-05-08 PROCEDURE — G0151 HHCP-SERV OF PT,EA 15 MIN: HCPCS

## 2024-05-08 ASSESSMENT — ENCOUNTER SYMPTOMS
PAIN LOCATION - PAIN QUALITY: ACHING
CONSTIPATION: 1
DYSPNEA ACTIVITY LEVEL: AFTER AMBULATING MORE THAN 20 FT

## 2024-05-10 ENCOUNTER — HOME CARE VISIT (OUTPATIENT)
Dept: SCHEDULING | Facility: HOME HEALTH | Age: 59
End: 2024-05-10
Payer: COMMERCIAL

## 2024-05-10 ENCOUNTER — TELEPHONE (OUTPATIENT)
Dept: ORTHOPEDIC SURGERY | Age: 59
End: 2024-05-10

## 2024-05-10 VITALS
DIASTOLIC BLOOD PRESSURE: 80 MMHG | TEMPERATURE: 99.3 F | OXYGEN SATURATION: 100 % | HEART RATE: 88 BPM | RESPIRATION RATE: 17 BRPM | SYSTOLIC BLOOD PRESSURE: 140 MMHG

## 2024-05-10 PROCEDURE — G0157 HHC PT ASSISTANT EA 15: HCPCS

## 2024-05-10 NOTE — TELEPHONE ENCOUNTER
Amy with Home[facility] is calling to report she has not had a BM since surgery. She has been doing Miralax and Senokot.

## 2024-05-13 ENCOUNTER — TELEPHONE (OUTPATIENT)
Dept: ORTHOPEDICS UNIT | Age: 59
End: 2024-05-13

## 2024-05-13 ENCOUNTER — HOME CARE VISIT (OUTPATIENT)
Dept: SCHEDULING | Facility: HOME HEALTH | Age: 59
End: 2024-05-13
Payer: COMMERCIAL

## 2024-05-13 VITALS
TEMPERATURE: 98 F | DIASTOLIC BLOOD PRESSURE: 72 MMHG | HEART RATE: 88 BPM | OXYGEN SATURATION: 99 % | RESPIRATION RATE: 17 BRPM | SYSTOLIC BLOOD PRESSURE: 130 MMHG

## 2024-05-13 PROCEDURE — G0157 HHC PT ASSISTANT EA 15: HCPCS

## 2024-05-13 NOTE — TELEPHONE ENCOUNTER
Spoke with patient.  Reports bowels moved on Saturday 5/11/24.  Advised to continue senna or miralax QD.  Patient verbalized understanding.

## 2024-05-15 ENCOUNTER — HOME CARE VISIT (OUTPATIENT)
Dept: SCHEDULING | Facility: HOME HEALTH | Age: 59
End: 2024-05-15
Payer: COMMERCIAL

## 2024-05-15 ENCOUNTER — CLINICAL DOCUMENTATION (OUTPATIENT)
Dept: ORTHOPEDIC SURGERY | Age: 59
End: 2024-05-15

## 2024-05-15 VITALS
OXYGEN SATURATION: 98 % | TEMPERATURE: 98 F | DIASTOLIC BLOOD PRESSURE: 68 MMHG | SYSTOLIC BLOOD PRESSURE: 120 MMHG | RESPIRATION RATE: 17 BRPM | HEART RATE: 76 BPM

## 2024-05-15 PROCEDURE — G0157 HHC PT ASSISTANT EA 15: HCPCS

## 2024-05-15 ASSESSMENT — ENCOUNTER SYMPTOMS: PAIN LOCATION - PAIN QUALITY: SORE, ACHY

## 2024-05-16 ENCOUNTER — TELEPHONE (OUTPATIENT)
Dept: ORTHOPEDICS UNIT | Age: 59
End: 2024-05-16

## 2024-05-16 ENCOUNTER — HOME CARE VISIT (OUTPATIENT)
Dept: SCHEDULING | Facility: HOME HEALTH | Age: 59
End: 2024-05-16
Payer: COMMERCIAL

## 2024-05-16 ENCOUNTER — HOME CARE VISIT (OUTPATIENT)
Dept: HOME HEALTH SERVICES | Facility: HOME HEALTH | Age: 59
End: 2024-05-16
Payer: COMMERCIAL

## 2024-05-16 ENCOUNTER — TELEPHONE (OUTPATIENT)
Dept: ORTHOPEDIC SURGERY | Age: 59
End: 2024-05-16

## 2024-05-16 VITALS
DIASTOLIC BLOOD PRESSURE: 76 MMHG | HEART RATE: 76 BPM | TEMPERATURE: 98 F | OXYGEN SATURATION: 99 % | SYSTOLIC BLOOD PRESSURE: 138 MMHG | RESPIRATION RATE: 17 BRPM

## 2024-05-16 PROCEDURE — G0157 HHC PT ASSISTANT EA 15: HCPCS

## 2024-05-16 ASSESSMENT — ENCOUNTER SYMPTOMS: PAIN LOCATION - PAIN QUALITY: SORE, ACHY

## 2024-05-16 NOTE — TELEPHONE ENCOUNTER
She is having nightmares and at times she is seeing things. Margi is asking if you will call the patient and discuss with her or if she can have a different medicine. She is only taking one at a time.    Detail Level: Zone

## 2024-05-16 NOTE — TELEPHONE ENCOUNTER
Spoke with patient.  Is having hallucinations and nightmares.  Is taking oxycodone for post op pain.  Will stop taking oxycodone.  Message sent to Dr. Aguilar's office for possible narcotic change.  Will take ES tylenol for post op pain until otherwise instructed.

## 2024-05-17 DIAGNOSIS — Z96.652 STATUS POST LEFT KNEE REPLACEMENT: Primary | ICD-10-CM

## 2024-05-17 RX ORDER — HYDROCODONE BITARTRATE AND ACETAMINOPHEN 7.5; 325 MG/1; MG/1
1 TABLET ORAL EVERY 4 HOURS
Qty: 30 TABLET | Refills: 0 | Status: SHIPPED | OUTPATIENT
Start: 2024-05-17 | End: 2024-05-22

## 2024-05-17 NOTE — TELEPHONE ENCOUNTER
I called and spoke to patient. I let patient know that Vikash sent in Norco 7.5 for her pain. I informed patient that she needs to cut normal tylenol dose in half and not to exceed 4,000mg a day. Patient verbalized understanding.

## 2024-05-20 ENCOUNTER — HOME CARE VISIT (OUTPATIENT)
Dept: HOME HEALTH SERVICES | Facility: HOME HEALTH | Age: 59
End: 2024-05-20
Payer: COMMERCIAL

## 2024-05-20 ENCOUNTER — HOME CARE VISIT (OUTPATIENT)
Dept: SCHEDULING | Facility: HOME HEALTH | Age: 59
End: 2024-05-20
Payer: COMMERCIAL

## 2024-05-20 VITALS
DIASTOLIC BLOOD PRESSURE: 70 MMHG | RESPIRATION RATE: 17 BRPM | TEMPERATURE: 98 F | SYSTOLIC BLOOD PRESSURE: 120 MMHG | OXYGEN SATURATION: 99 % | HEART RATE: 84 BPM

## 2024-05-20 PROCEDURE — G0157 HHC PT ASSISTANT EA 15: HCPCS

## 2024-05-20 ASSESSMENT — ENCOUNTER SYMPTOMS: PAIN LOCATION - PAIN QUALITY: SORE, ACHY

## 2024-05-21 ENCOUNTER — CLINICAL DOCUMENTATION (OUTPATIENT)
Dept: ORTHOPEDIC SURGERY | Age: 59
End: 2024-05-21

## 2024-05-24 ENCOUNTER — HOME CARE VISIT (OUTPATIENT)
Dept: SCHEDULING | Facility: HOME HEALTH | Age: 59
End: 2024-05-24
Payer: COMMERCIAL

## 2024-05-24 VITALS
OXYGEN SATURATION: 97 % | HEART RATE: 88 BPM | SYSTOLIC BLOOD PRESSURE: 120 MMHG | DIASTOLIC BLOOD PRESSURE: 70 MMHG | TEMPERATURE: 98 F | RESPIRATION RATE: 17 BRPM

## 2024-05-24 PROCEDURE — G0157 HHC PT ASSISTANT EA 15: HCPCS

## 2024-05-24 ASSESSMENT — ENCOUNTER SYMPTOMS: PAIN LOCATION - PAIN QUALITY: SORE, STIFF

## 2024-05-28 ENCOUNTER — HOME CARE VISIT (OUTPATIENT)
Dept: SCHEDULING | Facility: HOME HEALTH | Age: 59
End: 2024-05-28
Payer: COMMERCIAL

## 2024-05-28 VITALS
TEMPERATURE: 98 F | RESPIRATION RATE: 18 BRPM | DIASTOLIC BLOOD PRESSURE: 78 MMHG | OXYGEN SATURATION: 97 % | SYSTOLIC BLOOD PRESSURE: 130 MMHG | HEART RATE: 92 BPM

## 2024-05-28 PROCEDURE — G0157 HHC PT ASSISTANT EA 15: HCPCS

## 2024-05-28 ASSESSMENT — ENCOUNTER SYMPTOMS: PAIN LOCATION - PAIN QUALITY: SORE

## 2024-05-30 ENCOUNTER — OFFICE VISIT (OUTPATIENT)
Dept: ORTHOPEDIC SURGERY | Age: 59
End: 2024-05-30

## 2024-05-30 DIAGNOSIS — Z96.651 STATUS POST RIGHT KNEE REPLACEMENT: Primary | ICD-10-CM

## 2024-05-30 DIAGNOSIS — Z09 FOLLOW-UP EXAMINATION FOLLOWING SURGERY: ICD-10-CM

## 2024-05-30 PROCEDURE — 99024 POSTOP FOLLOW-UP VISIT: CPT | Performed by: ORTHOPAEDIC SURGERY

## 2024-05-30 RX ORDER — CLINDAMYCIN HYDROCHLORIDE 300 MG/1
CAPSULE ORAL
Qty: 6 CAPSULE | Refills: 1 | Status: SHIPPED | OUTPATIENT
Start: 2024-05-30

## 2024-05-30 NOTE — PROGRESS NOTES
Patient ID:  Sierra Grace  [id number]  59 y.o.  1965    Today: May 30, 2024    CHIEF COMPLAINT:  Follow-up right total knee replacement    HISTORY:  The patient presents today for 3-week follow-up after knee replacement.  The patient is doing very well, is on aspirin for DVT prophylaxis.  The patient is working with physical therapy to regain some strength and motion.  Continues to take medication appropriately.  The patient has done a good job keeping dressing/wound clean and dry.  The patient is progressing nicely postoperatively.    Patient continues to do well 3 months s/p left TKA.    PE:  Incision is examined which is well healed.  No erythema, induration or drainage. No significant fluid accumulation around the surgical site.  ROM is 2 to 100 degrees on the right and 0 to 120 degrees on the left.  Overall the knee appears stable to varus/valgus stress throughout arc of motion.  Anterior drawer testing at 45 and 90º of flexion is stable.  Posterior drawer testing is stable.  Distally able to plantar and dorsiflex foot and ankle.  Sensation intact.  Limb is perfused.  No sign of DVT.    X-RAYS:    XR RT Knee 2/3 view  Views Obtained: AP, Lateral and Sunrise views of the right knee  Indication: Postop Right TKA  Findings: All hardware to be intact.  All the components appear to be well sized without any evidence of loosening.  Overall mechanical alignment appears to be within acceptable criteria. No evidence of fracture.  Patella appears to be tracking appropriately within the trochlear groove.  Impression: Normal Xray after right total knee replacement    JON HILLMAN, DELVIN - CNP    ASSESSMENT:  3 Weeks S/P Right Total Knee Replacement    PLAN:  Continue activity and weight bearing as tolerated.  Continue PT/OT to focus on strengthening and stretching.  Continue to take pain medication appropriately.  The patient will continue to take aspirin for another week for a full month of DVT

## 2024-05-31 ENCOUNTER — HOME CARE VISIT (OUTPATIENT)
Dept: SCHEDULING | Facility: HOME HEALTH | Age: 59
End: 2024-05-31
Payer: COMMERCIAL

## 2024-05-31 VITALS
HEART RATE: 83 BPM | OXYGEN SATURATION: 97 % | DIASTOLIC BLOOD PRESSURE: 84 MMHG | TEMPERATURE: 98 F | RESPIRATION RATE: 18 BRPM | SYSTOLIC BLOOD PRESSURE: 128 MMHG

## 2024-05-31 PROCEDURE — G0151 HHCP-SERV OF PT,EA 15 MIN: HCPCS

## 2024-06-03 ENCOUNTER — EVALUATION (OUTPATIENT)
Age: 59
End: 2024-06-03
Payer: COMMERCIAL

## 2024-06-03 ENCOUNTER — CLINICAL DOCUMENTATION (OUTPATIENT)
Dept: ORTHOPEDIC SURGERY | Age: 59
End: 2024-06-03

## 2024-06-03 DIAGNOSIS — M25.661 STIFFNESS OF RIGHT KNEE: ICD-10-CM

## 2024-06-03 DIAGNOSIS — M25.561 ACUTE PAIN OF RIGHT KNEE: Primary | ICD-10-CM

## 2024-06-03 DIAGNOSIS — M62.81 MUSCLE WEAKNESS: ICD-10-CM

## 2024-06-03 DIAGNOSIS — R26.2 DIFFICULTY WALKING: ICD-10-CM

## 2024-06-03 DIAGNOSIS — Z96.651 STATUS POST RIGHT KNEE REPLACEMENT: ICD-10-CM

## 2024-06-03 PROCEDURE — 97016 VASOPNEUMATIC DEVICE THERAPY: CPT | Performed by: PHYSICAL THERAPIST

## 2024-06-03 PROCEDURE — 97161 PT EVAL LOW COMPLEX 20 MIN: CPT | Performed by: PHYSICAL THERAPIST

## 2024-06-03 PROCEDURE — 97110 THERAPEUTIC EXERCISES: CPT | Performed by: PHYSICAL THERAPIST

## 2024-06-03 NOTE — PROGRESS NOTES
[facility] PT INT [facility]  [address] [place] [id number]  Dept: [phone]      Physical Therapy Initial Assessment     Referring MD: Ebenezer Aguilar MD  Diagnosis:     ICD-10-CM    1. Acute pain of right knee  M25.561       2. Stiffness of right knee  M25.661       3. Muscle weakness  M62.81       4. Difficulty walking  R26.2          Surgery: RIGHT KNEE TOTAL ARTHROPLASTY ROBOTIC Date: 5/6/2024   Therapy precautions: blood bourne illness  Co-morbidities affecting plan of care: Obesity, HIV, L TKA 2/7/24, umbilical hernia            Payor: Payor: [facility] /  /  /  Billing pattern: Commercial- substantial/midpoint time each CPT  Total Timed Codes: 10 min, Total Treatment Time: 50 min Modifier needed: No    Episode visit count:  1     PERTINENT MEDICAL HISTORY     Past medical and surgical history:   Past Medical History:   Diagnosis Date    Asymptomatic postmenopausal state     Autoimmune disease (HCC)     HIV    Daytime somnolence     Folic acid deficiency     HIV (human immunodeficiency virus infection) (HCC) 12/2/2016    [place] [facility], per note (12/2023) \"Most recent labs performed on 12/1/2023 show a CD4 count of 1,030/51%-previously 831/49%-before that 921/54%-before that 950/53%-before that 843/50%. Viral load remains undetectable less than 20 which is normal for this patient.\"    Iron deficiency anemia     2 IV Injectafer infusions    Narcolepsy     CASSY on CPAP     newly dx--C-pap nightly    Other ill-defined conditions(799.89)     Tooth abcess    Primary osteoarthritis of left knee 11/27/2023      Past Surgical History:   Procedure Laterality Date    ORTHOPEDIC SURGERY      R foot fx    TOTAL KNEE ARTHROPLASTY Left 2/7/2024    LEFT KNEE TOTAL ARTHROPLASTY ROBOTIC, [place]/TRAVIS, Prevena, Vanc, Cellerate, Tobra/ SDD performed by Ebenezer Aguilar MD at [facility] MAIN OR    TOTAL KNEE ARTHROPLASTY Right 5/6/2024    RIGHT KNEE TOTAL ARTHROPLASTY ROBOTIC, [place]/TRAVIS, Prevena, Vanc,

## 2024-06-05 ENCOUNTER — TREATMENT (OUTPATIENT)
Age: 59
End: 2024-06-05
Payer: COMMERCIAL

## 2024-06-05 ENCOUNTER — CLINICAL DOCUMENTATION (OUTPATIENT)
Dept: ORTHOPEDIC SURGERY | Age: 59
End: 2024-06-05

## 2024-06-05 DIAGNOSIS — M25.661 STIFFNESS OF RIGHT KNEE: ICD-10-CM

## 2024-06-05 DIAGNOSIS — M62.81 MUSCLE WEAKNESS: ICD-10-CM

## 2024-06-05 DIAGNOSIS — M25.561 ACUTE PAIN OF RIGHT KNEE: Primary | ICD-10-CM

## 2024-06-05 DIAGNOSIS — Z96.651 STATUS POST RIGHT KNEE REPLACEMENT: ICD-10-CM

## 2024-06-05 DIAGNOSIS — R26.2 DIFFICULTY WALKING: ICD-10-CM

## 2024-06-05 PROCEDURE — 97140 MANUAL THERAPY 1/> REGIONS: CPT | Performed by: PHYSICAL THERAPIST

## 2024-06-05 PROCEDURE — 97110 THERAPEUTIC EXERCISES: CPT | Performed by: PHYSICAL THERAPIST

## 2024-06-05 PROCEDURE — 97016 VASOPNEUMATIC DEVICE THERAPY: CPT | Performed by: PHYSICAL THERAPIST

## 2024-06-05 NOTE — PROGRESS NOTES
[facility] PT INT [facility]  [address] [facility] [address]  Dept: [phone]      Physical Therapy Daily Note     Referring MD: Ebenezer Aguilar MD  Diagnosis:     ICD-10-CM    1. Acute pain of right knee  M25.561       2. Stiffness of right knee  M25.661       3. Muscle weakness  M62.81       4. Difficulty walking  R26.2       5. Status post right knee replacement [Z96.651]  Z96.651          Surgery: RIGHT KNEE TOTAL ARTHROPLASTY ROBOTIC Date: 5/6/2024   Therapy precautions: blood bourne illness  Co-morbidities affecting plan of care: Obesity, HIV, L TKA 2/7/24, umbilical hernia          Chief complaints/history of injury: Pt underwent R TKA 5/6/24 secondary to DJD with 1 night acute care stay followed by home health PT 5/31/24. No complications to date.   Describe current symptoms: Pain, stiffness, swelling, R knee, weakness RLE . L knee still feels a little stiff at times.   Patient Stated Goals: return to walking without device at least 10 min, yard work, improve community mobility    Payor: Payor: [employer] /  /  /  Billing pattern: Commercial- substantial/midpoint time each CPT   Total Timed Procedure Codes: 40 min, Total Time: 50 min Modifier needed: No  Episode visit count:  2     SUBJECTIVE     Pt reports that knee is doing well and she can sit longer. Pt started driving yesterday.     Medications: no changes since last session    OBJECTIVE     Findings: R knee flexion AROM 116° post session    Treatment provided today:  Therapeutic exercise (64948) x 30 min to develop ROM, strength, endurance and flexibility RLE.  Nustep level 6 x 5 min  R knee flexion stretch on second step H10sec x 5  R step ups 6-inch step, BUE support, therapist support at R knee x 10  Standing heel raises 2x10  Seated R knee flexion stretch with overpressure H30sec x 3  Short arc quads 3x10 B  Hooklying bridge 2x10  Passive R knee flexion in supine  Updated HEP  Manual therapy (98924) x 10 min utilizing

## 2024-06-10 ENCOUNTER — CLINICAL DOCUMENTATION (OUTPATIENT)
Dept: ORTHOPEDIC SURGERY | Age: 59
End: 2024-06-10

## 2024-06-10 ENCOUNTER — TREATMENT (OUTPATIENT)
Age: 59
End: 2024-06-10
Payer: COMMERCIAL

## 2024-06-10 DIAGNOSIS — M62.81 MUSCLE WEAKNESS: ICD-10-CM

## 2024-06-10 DIAGNOSIS — M25.661 STIFFNESS OF RIGHT KNEE: ICD-10-CM

## 2024-06-10 DIAGNOSIS — M25.561 ACUTE PAIN OF RIGHT KNEE: Primary | ICD-10-CM

## 2024-06-10 DIAGNOSIS — R26.2 DIFFICULTY WALKING: ICD-10-CM

## 2024-06-10 PROCEDURE — 97140 MANUAL THERAPY 1/> REGIONS: CPT | Performed by: PHYSICAL THERAPIST

## 2024-06-10 PROCEDURE — 97110 THERAPEUTIC EXERCISES: CPT | Performed by: PHYSICAL THERAPIST

## 2024-06-10 PROCEDURE — 97016 VASOPNEUMATIC DEVICE THERAPY: CPT | Performed by: PHYSICAL THERAPIST

## 2024-06-12 ENCOUNTER — TREATMENT (OUTPATIENT)
Age: 59
End: 2024-06-12
Payer: COMMERCIAL

## 2024-06-12 DIAGNOSIS — M62.81 MUSCLE WEAKNESS: ICD-10-CM

## 2024-06-12 DIAGNOSIS — R26.2 DIFFICULTY WALKING: ICD-10-CM

## 2024-06-12 DIAGNOSIS — Z96.651 STATUS POST RIGHT KNEE REPLACEMENT: ICD-10-CM

## 2024-06-12 DIAGNOSIS — M25.561 ACUTE PAIN OF RIGHT KNEE: Primary | ICD-10-CM

## 2024-06-12 DIAGNOSIS — M25.661 STIFFNESS OF RIGHT KNEE: ICD-10-CM

## 2024-06-12 PROCEDURE — 97110 THERAPEUTIC EXERCISES: CPT

## 2024-06-12 PROCEDURE — 97140 MANUAL THERAPY 1/> REGIONS: CPT

## 2024-06-12 PROCEDURE — 97530 THERAPEUTIC ACTIVITIES: CPT

## 2024-06-12 PROCEDURE — 97016 VASOPNEUMATIC DEVICE THERAPY: CPT

## 2024-06-12 NOTE — PROGRESS NOTES
[facility] PT [facility]  [address] [place] [id number]  Dept: [phone]      Physical Therapy Daily Note     Referring MD: Ebenezer Aguilar MD  Diagnosis:     ICD-10-CM    1. Acute pain of right knee  M25.561       2. Stiffness of right knee  M25.661       3. Muscle weakness  M62.81       4. Difficulty walking  R26.2       5. Status post right knee replacement [Z96.651]  Z96.651            Surgery: RIGHT KNEE TOTAL ARTHROPLASTY ROBOTIC Date: 5/6/2024   Therapy precautions: blood bourne illness  Co-morbidities affecting plan of care: Obesity, HIV, L TKA 2/7/24, umbilical hernia          Chief complaints/history of injury: Pt underwent R TKA 5/6/24 secondary to DJD with 1 night acute care stay followed by home health PT 5/31/24. No complications to date.   Describe current symptoms: Pain, stiffness, swelling, R knee, weakness RLE . L knee still feels a little stiff at times.   Patient Stated Goals: return to walking without device at least 10 min, yard work, improve community mobility    Payor: Payor: [employer] /  /  /  Billing pattern: Commercial- substantial/midpoint time each CPT   Total Timed Procedure Codes: 45 min, Total Time: 60 min Modifier needed: No  Episode visit count:  4     SUBJECTIVE     Patient reports more pain today. She has been going up and down the stairs a lot today.     Medications: no changes since last session    OBJECTIVE     Findings: R knee flexion AROM 112° post session    Treatment provided today:  Therapeutic exercise (39903) x 20 min to develop ROM, strength, endurance and flexibility RLE.  Short arc quads 3x10 B on table,  Hamstring curls red PB 3 x 10   Seated R knee flexion stretch with overpressure H 30sec x 3  R knee flexion stretch on second step H10sec x 5  Slantboard stretch 3 x 30 sec   Therapeutic activities (17615) x 15 min using dynamic activities to improve function related to R knee.   Nustep level 7 x 5 min  Minisquats at barr 3 x 10  Standing

## 2024-06-17 ENCOUNTER — TREATMENT (OUTPATIENT)
Age: 59
End: 2024-06-17
Payer: COMMERCIAL

## 2024-06-17 DIAGNOSIS — R26.2 DIFFICULTY WALKING: ICD-10-CM

## 2024-06-17 DIAGNOSIS — M62.81 MUSCLE WEAKNESS: ICD-10-CM

## 2024-06-17 DIAGNOSIS — M25.661 STIFFNESS OF RIGHT KNEE: ICD-10-CM

## 2024-06-17 DIAGNOSIS — M25.561 ACUTE PAIN OF RIGHT KNEE: Primary | ICD-10-CM

## 2024-06-17 PROCEDURE — 97110 THERAPEUTIC EXERCISES: CPT | Performed by: PHYSICAL THERAPIST

## 2024-06-17 PROCEDURE — 97140 MANUAL THERAPY 1/> REGIONS: CPT | Performed by: PHYSICAL THERAPIST

## 2024-06-17 NOTE — PROGRESS NOTES
2x10  Partial squat (sissy) 2x10 w/ BUE support  Slantboard stretch 3 x 30 sec   Passive R knee flexion in supine    Manual therapy (48132) x 10 min utilizing techniques to improve joint and/or soft tissue mobility, ROM, and function as well as helping to decrease pain/spasms and swelling.  Palpation and assessment of soft tissue, muscles, and landmarks   STM distal quadriceps  R patella mobs all planes  Myofascial release anterior knee at end range flexion  Scar tissue mobilization over incision     Pt did not have time, given a bag of ice to take home.     ASSESSMENT     Patient with increased tightness R knee and complained of initial tension along front of knee that improved with manual therapy. ROM looks excellent. Quad strength remains deficient with significant lag in SLR.     PLAN     Continue with R leg strengthening and ROM . Continue with weight bearing quad strength- partial squats, closed chain TKE    Effective Dates/Duration: 6/3/2024 TO 8/2/2024 (60 days).    Frequency: 2x/week   Interventions may include but are not limited to:   (84171) Therapeutic exercise to develop ROM, strength, endurance and flexibility  (49714) Therapeutic activities using dynamic activities to improve function  (92569) Gait training to address mechanics, proper step length and weight shifting to improve household and community mobility as well as overall safety with ADLs  (93839) Manual therapy techniques to improve joint and/or soft tissue mobility, ROM, and function as well as helping to decrease pain/spasms and swelling  Home exercise program (HEP) development  Modalities prn to address pain, spasms, and swelling: (09992) Vasopneumatic compression      GOALS     Goals:  Short term goals to be met by 7/1/2024  (4 weeks):  Pt will demonstrate good recall of HEP requiring minimal verbal cuing for proper form and technique.  Pt will decrease swelling at involved LE/knee by 1 cm at mid-patella, which contributes to greater ROM

## 2024-06-19 ENCOUNTER — TREATMENT (OUTPATIENT)
Age: 59
End: 2024-06-19
Payer: COMMERCIAL

## 2024-06-19 DIAGNOSIS — M62.81 MUSCLE WEAKNESS: ICD-10-CM

## 2024-06-19 DIAGNOSIS — M25.661 STIFFNESS OF RIGHT KNEE: ICD-10-CM

## 2024-06-19 DIAGNOSIS — M25.561 ACUTE PAIN OF RIGHT KNEE: Primary | ICD-10-CM

## 2024-06-19 DIAGNOSIS — R26.2 DIFFICULTY WALKING: ICD-10-CM

## 2024-06-19 PROCEDURE — 97110 THERAPEUTIC EXERCISES: CPT | Performed by: PHYSICAL THERAPIST

## 2024-06-24 ENCOUNTER — TREATMENT (OUTPATIENT)
Age: 59
End: 2024-06-24
Payer: COMMERCIAL

## 2024-06-24 DIAGNOSIS — R26.2 DIFFICULTY WALKING: ICD-10-CM

## 2024-06-24 DIAGNOSIS — M25.661 STIFFNESS OF RIGHT KNEE: ICD-10-CM

## 2024-06-24 DIAGNOSIS — M25.561 ACUTE PAIN OF RIGHT KNEE: Primary | ICD-10-CM

## 2024-06-24 DIAGNOSIS — M62.81 MUSCLE WEAKNESS: ICD-10-CM

## 2024-06-24 PROCEDURE — 97110 THERAPEUTIC EXERCISES: CPT | Performed by: PHYSICAL THERAPIST

## 2024-06-26 ENCOUNTER — TREATMENT (OUTPATIENT)
Age: 59
End: 2024-06-26
Payer: COMMERCIAL

## 2024-06-26 DIAGNOSIS — M25.561 ACUTE PAIN OF RIGHT KNEE: Primary | ICD-10-CM

## 2024-06-26 DIAGNOSIS — R26.2 DIFFICULTY WALKING: ICD-10-CM

## 2024-06-26 DIAGNOSIS — M25.661 STIFFNESS OF RIGHT KNEE: ICD-10-CM

## 2024-06-26 DIAGNOSIS — M62.81 MUSCLE WEAKNESS: ICD-10-CM

## 2024-06-26 PROCEDURE — 97110 THERAPEUTIC EXERCISES: CPT | Performed by: PHYSICAL THERAPIST

## 2024-06-26 NOTE — PROGRESS NOTES
[facility] PT INT [facility] ORTHOPAED[facility]  [address] [place] [id number]  Dept: [phone]      Physical Therapy Daily Note     Referring MD: Ebenezer Aguilar MD  Diagnosis:     ICD-10-CM    1. Acute pain of right knee  M25.561       2. Stiffness of right knee  M25.661       3. Muscle weakness  M62.81       4. Difficulty walking  R26.2           Surgery: RIGHT KNEE TOTAL ARTHROPLASTY ROBOTIC Date: 5/6/2024   Therapy precautions: blood bourne illness  Co-morbidities affecting plan of care: Obesity, HIV, L TKA 2/7/24, umbilical hernia          Chief complaints/history of injury: Pt underwent R TKA 5/6/24 secondary to DJD with 1 night acute care stay followed by home health PT 5/31/24. No complications to date.   Describe current symptoms: Pain, stiffness, swelling, R knee, weakness RLE . L knee still feels a little stiff at times.   Patient Stated Goals: return to walking without device at least 10 min, yard work, improve community mobility    Payor: Payor: NAYANA /  /  /  Billing pattern: Commercial- substantial/midpoint time each CPT   Total Timed Procedure Codes: 40 min, Total Time: 50 min Modifier needed: No  Episode visit count:  8     SUBJECTIVE     Patient reports that lifting her leg remains difficult.    Medications: no changes since last session    OBJECTIVE     Treatment provided today:  Therapeutic exercise (08713) x 40 min to develop ROM, strength, endurance and flexibility RLE.  Upright bike level 3 x 10 min (3 holes showing) - non-billable  Bilateral leg press 110# 3x8  Bilateral hamstring curls 50# 3x10  R unilateral leg press 50# 3x8  R knee flexion stretch on second step H10sec x 5  6-inch lateral step down with BUE support 3x5   Standing heel raises 2x15  Partial wall sit H1min x 2  Standing R quad burner with slight R knee flexion, LLE   Standing TKE to R single leg stance against long green band H5sec/relax x 2 min w/ LUE support (stick)  Sit-stand with L foot forward x 11- half speed

## 2024-07-01 ENCOUNTER — TREATMENT (OUTPATIENT)
Age: 59
End: 2024-07-01
Payer: COMMERCIAL

## 2024-07-01 DIAGNOSIS — R26.2 DIFFICULTY WALKING: ICD-10-CM

## 2024-07-01 DIAGNOSIS — M25.561 ACUTE PAIN OF RIGHT KNEE: Primary | ICD-10-CM

## 2024-07-01 DIAGNOSIS — M62.81 MUSCLE WEAKNESS: ICD-10-CM

## 2024-07-01 DIAGNOSIS — M25.661 STIFFNESS OF RIGHT KNEE: ICD-10-CM

## 2024-07-01 PROCEDURE — 97110 THERAPEUTIC EXERCISES: CPT | Performed by: PHYSICAL THERAPIST

## 2024-07-01 NOTE — PROGRESS NOTES
[facility] PT INT [facility] ORTHOPAEDICS  [address] [place] [id number]  Dept: [phone]      Physical Therapy Daily Note     Referring MD: Ebenezer Aguilar MD  Diagnosis:     ICD-10-CM    1. Acute pain of right knee  M25.561       2. Stiffness of right knee  M25.661       3. Muscle weakness  M62.81       4. Difficulty walking  R26.2           Surgery: RIGHT KNEE TOTAL ARTHROPLASTY ROBOTIC Date: 5/6/2024   Therapy precautions: blood bourne illness  Co-morbidities affecting plan of care: Obesity, HIV, L TKA 2/7/24, umbilical hernia          Chief complaints/history of injury: Pt underwent R TKA 5/6/24 secondary to DJD with 1 night acute care stay followed by home health PT 5/31/24. No complications to date.   Describe current symptoms: Pain, stiffness, swelling, R knee, weakness RLE . L knee still feels a little stiff at times.   Patient Stated Goals: return to walking without device at least 10 min, yard work, improve community mobility    Payor: Payor: NAYANA /  /  /  Billing pattern: Commercial- substantial/midpoint time each CPT   Total Timed Procedure Codes: 55 min, Total Time: 55 min Modifier needed: No  Episode visit count:  9     SUBJECTIVE     Patient reports that she has added wall sits to her home program.     Medications: no changes since last session    OBJECTIVE     Treatment provided today:  Therapeutic exercise (53204) x 55 min to develop ROM, strength, endurance and flexibility RLE.  Upright bike level 3 x 8 min (3 holes showing) - non-billable  Bilateral leg press 110# 3x10  R unilateral leg press 50# 2x10,   Bilateral hamstring curls 50# 3x10  Isometric quad at knee extension machine, H10sec x 3 each (3 angles)  R knee flexion stretch on second step H10sec x 5  6-inch lateral step down with BUE support 3x5   Standing heel raises 2x15  Standing R quad burner with slight R knee flexion, LLE x 1 min  R single leg stance H20sec x 3 w/ intermittent UE support  Partial wall sit H1min x

## 2024-07-03 ENCOUNTER — TREATMENT (OUTPATIENT)
Age: 59
End: 2024-07-03
Payer: COMMERCIAL

## 2024-07-03 DIAGNOSIS — M62.81 MUSCLE WEAKNESS: ICD-10-CM

## 2024-07-03 DIAGNOSIS — M25.561 ACUTE PAIN OF RIGHT KNEE: Primary | ICD-10-CM

## 2024-07-03 DIAGNOSIS — R26.2 DIFFICULTY WALKING: ICD-10-CM

## 2024-07-03 DIAGNOSIS — M25.661 STIFFNESS OF RIGHT KNEE: ICD-10-CM

## 2024-07-03 PROCEDURE — 97110 THERAPEUTIC EXERCISES: CPT | Performed by: PHYSICAL THERAPIST

## 2024-07-03 NOTE — PROGRESS NOTES
days):  Pt will be compliant and independent with a comprehensive HEP and activity progression.   Demonstrate AROM of involved knee to be 120 degrees, allowing for ADLs with min restrictions related to knee stiffness.    Pt will increase LE strength to at least 5/5 with MMT for improved maximal stability during gait.   Pt to walk without device, demonstrating good stability when faced with obstacles to allow for safe community ambulation.  Pt will independently ascend and descend steps with reciprocal pattern demonstrating good control and balance using rails only for balance.   Improve LEFS to 55/80 demonstrating min functional restrictions with ADLs, work and athletic activities.     [employer]  Access Code: [email or web address]  URL: [email or web address]  Date: 06/24/2024  Prepared by: Mary Gardner    Program Notes  seated leg lift with knee fully extended, 3x5    Exercises  - Seated Hamstring Stretch  - 1 x daily - 1 sets - 3 reps - 30 hold  - Seated Knee Flexion Stretch  - 2 x daily - 1 sets - 3 reps - 30 hold  - Supine Knee Extension Strengthening  - 1 x daily - 2 sets - 15 reps - 5 hold  - Beginner Bridge  - 5 x weekly - 2 sets - 10 reps - 5 hold  - Sidelying Hip Abduction  - 5 x weekly - 2 sets - 8 reps - 3 sec hold  - Active Straight Leg Raise with Quad Set  - 2 x daily - 2-3 sets - 5 reps - 3 hold  - Standing Heel Raises  - 5 x weekly - 2 sets - 15 reps - 5 hold  - Sit to Stand  - 5 x weekly - 2 sets - 10 reps  - Forward Step Up  - 4-5 x weekly - 2 sets - 8 reps

## 2024-07-03 NOTE — PROGRESS NOTES
will be compliant and independent with a comprehensive HEP and activity progression.   Demonstrate AROM of involved knee to be 120 degrees, allowing for ADLs with min restrictions related to knee stiffness.    Pt will increase LE strength to at least 5/5 with MMT for improved maximal stability during gait.   Pt to walk without device, demonstrating good stability when faced with obstacles to allow for safe community ambulation.  Pt will independently ascend and descend steps with reciprocal pattern demonstrating good control and balance using rails only for balance.   Improve LEFS to 55/80 demonstrating min functional restrictions with ADLs, work and athletic activities.     [employer]  Access Code: [email or web address]  URL: [email or web address]  Date: 06/24/2024  Prepared by: Mary Gardner    Program Notes  seated leg lift with knee fully extended, 3x5    Exercises  - Seated Hamstring Stretch  - 1 x daily - 1 sets - 3 reps - 30 hold  - Seated Knee Flexion Stretch  - 2 x daily - 1 sets - 3 reps - 30 hold  - Supine Knee Extension Strengthening  - 1 x daily - 2 sets - 15 reps - 5 hold  - Beginner Bridge  - 5 x weekly - 2 sets - 10 reps - 5 hold  - Sidelying Hip Abduction  - 5 x weekly - 2 sets - 8 reps - 3 sec hold  - Active Straight Leg Raise with Quad Set  - 2 x daily - 2-3 sets - 5 reps - 3 hold  - Standing Heel Raises  - 5 x weekly - 2 sets - 15 reps - 5 hold  - Sit to Stand  - 5 x weekly - 2 sets - 10 reps  - Forward Step Up  - 4-5 x weekly - 2 sets - 8 reps

## 2024-07-09 ENCOUNTER — TREATMENT (OUTPATIENT)
Age: 59
End: 2024-07-09
Payer: COMMERCIAL

## 2024-07-09 DIAGNOSIS — R26.2 DIFFICULTY WALKING: ICD-10-CM

## 2024-07-09 DIAGNOSIS — M25.661 STIFFNESS OF RIGHT KNEE: ICD-10-CM

## 2024-07-09 DIAGNOSIS — M62.81 MUSCLE WEAKNESS: ICD-10-CM

## 2024-07-09 DIAGNOSIS — M25.561 ACUTE PAIN OF RIGHT KNEE: Primary | ICD-10-CM

## 2024-07-09 PROCEDURE — 97110 THERAPEUTIC EXERCISES: CPT | Performed by: PHYSICAL THERAPIST

## 2024-07-09 NOTE — PROGRESS NOTES
1 x daily - 1 sets - 3 reps - 30 hold  - Seated Knee Flexion Stretch  - 2 x daily - 1 sets - 3 reps - 30 hold  - Supine Knee Extension Strengthening  - 1 x daily - 2 sets - 15 reps - 5 hold  - Beginner Bridge  - 5 x weekly - 2 sets - 10 reps - 5 hold  - Sidelying Hip Abduction  - 5 x weekly - 2 sets - 8 reps - 3 sec hold  - Active Straight Leg Raise with Quad Set  - 2 x daily - 2-3 sets - 5 reps - 3 hold  - Standing Heel Raises  - 5 x weekly - 2 sets - 15 reps - 5 hold  - Sit to Stand  - 5 x weekly - 2 sets - 10 reps  - Forward Step Up  - 4-5 x weekly - 2 sets - 8 reps

## 2024-07-11 ENCOUNTER — TREATMENT (OUTPATIENT)
Age: 59
End: 2024-07-11
Payer: COMMERCIAL

## 2024-07-11 DIAGNOSIS — M25.561 ACUTE PAIN OF RIGHT KNEE: Primary | ICD-10-CM

## 2024-07-11 DIAGNOSIS — M62.81 MUSCLE WEAKNESS: ICD-10-CM

## 2024-07-11 DIAGNOSIS — M25.661 STIFFNESS OF RIGHT KNEE: ICD-10-CM

## 2024-07-11 DIAGNOSIS — R26.2 DIFFICULTY WALKING: ICD-10-CM

## 2024-07-11 PROCEDURE — 97110 THERAPEUTIC EXERCISES: CPT | Performed by: PHYSICAL THERAPIST

## 2024-07-11 NOTE — PROGRESS NOTES
[facility] PT INT [facility] ORTHOPAEDICS  [address] [place] [phone]  Dept: [phone]      Physical Therapy Daily Note     Referring MD: Ebenezer Aguilar MD  Diagnosis:     ICD-10-CM    1. Acute pain of right knee  M25.561       2. Stiffness of right knee  M25.661       3. Muscle weakness  M62.81       4. Difficulty walking  R26.2         Surgery: RIGHT KNEE TOTAL ARTHROPLASTY ROBOTIC Date: 5/6/2024   Therapy precautions: blood bourne illness  Co-morbidities affecting plan of care: Obesity, HIV, L TKA 2/7/24, umbilical hernia          Chief complaints/history of injury: Pt underwent R TKA 5/6/24 secondary to DJD with 1 night acute care stay followed by home health PT 5/31/24. No complications to date.   Describe current symptoms: Pain, stiffness, swelling, R knee, weakness RLE . L knee still feels a little stiff at times.   Patient Stated Goals: return to walking without device at least 10 min, yard work, improve community mobility    Payor: Payor: NAYANA /  /  /  Billing pattern: Commercial- substantial/midpoint time each CPT   Total Timed Procedure Codes: 40 min, Total Time: 50 min Modifier needed: No  Episode visit count:  12     SUBJECTIVE     Patient reports no significant changes.      Medications: no changes since last session    OBJECTIVE       Findings 6/3/2024  7/9/2024        LEFS score 35/80 46/80    LEFS % function 44% 58%    R knee flexion AROM 105° 115°    Supine SLR R 15° lag 15° lag    Quad set Poor + Poor +    MMT R knee extension 3-/5 3-/5    R Girth mid-patella 45.3 cm 44.1 cm        Treatment provided today:  Therapeutic exercise (69984) x 40 min to develop ROM, strength, endurance and flexibility RLE.  Current Exercises Past Exercises (not performed today)   Assessment of tolerance of previous therapy session  Upright bike level 3 x 8 min (3 holes showing)   Bilateral hamstring curls 50# 3x10  BFR as below  Bridge H10sec x 10  Prone knee flexion stretch  Standing TKE against

## 2024-07-15 ENCOUNTER — TREATMENT (OUTPATIENT)
Age: 59
End: 2024-07-15
Payer: COMMERCIAL

## 2024-07-15 DIAGNOSIS — R26.2 DIFFICULTY WALKING: ICD-10-CM

## 2024-07-15 DIAGNOSIS — M25.561 ACUTE PAIN OF RIGHT KNEE: Primary | ICD-10-CM

## 2024-07-15 DIAGNOSIS — M25.661 STIFFNESS OF RIGHT KNEE: ICD-10-CM

## 2024-07-15 DIAGNOSIS — M62.81 MUSCLE WEAKNESS: ICD-10-CM

## 2024-07-15 PROCEDURE — 97110 THERAPEUTIC EXERCISES: CPT | Performed by: PHYSICAL THERAPIST

## 2024-07-15 NOTE — PROGRESS NOTES
control and balance using rails only for balance.   Improve LEFS to 55/80 demonstrating min functional restrictions with ADLs, work and athletic activities.     [employer]  Access Code: [email or web address]  URL: [email or web address]  Date: 06/24/2024  Prepared by: Mary Gardner    Program Notes  seated leg lift with knee fully extended, 3x5    Exercises  - Seated Hamstring Stretch  - 1 x daily - 1 sets - 3 reps - 30 hold  - Seated Knee Flexion Stretch  - 2 x daily - 1 sets - 3 reps - 30 hold  - Supine Knee Extension Strengthening  - 1 x daily - 2 sets - 15 reps - 5 hold  - Beginner Bridge  - 5 x weekly - 2 sets - 10 reps - 5 hold  - Sidelying Hip Abduction  - 5 x weekly - 2 sets - 8 reps - 3 sec hold  - Active Straight Leg Raise with Quad Set  - 2 x daily - 2-3 sets - 5 reps - 3 hold  - Standing Heel Raises  - 5 x weekly - 2 sets - 15 reps - 5 hold  - Sit to Stand  - 5 x weekly - 2 sets - 10 reps  - Forward Step Up  - 4-5 x weekly - 2 sets - 8 reps

## 2024-07-17 ENCOUNTER — TREATMENT (OUTPATIENT)
Age: 59
End: 2024-07-17
Payer: COMMERCIAL

## 2024-07-17 DIAGNOSIS — M62.81 MUSCLE WEAKNESS: ICD-10-CM

## 2024-07-17 DIAGNOSIS — M25.561 ACUTE PAIN OF RIGHT KNEE: Primary | ICD-10-CM

## 2024-07-17 DIAGNOSIS — Z96.651 STATUS POST RIGHT KNEE REPLACEMENT: ICD-10-CM

## 2024-07-17 DIAGNOSIS — M25.661 STIFFNESS OF RIGHT KNEE: ICD-10-CM

## 2024-07-17 DIAGNOSIS — R26.2 DIFFICULTY WALKING: ICD-10-CM

## 2024-07-17 PROCEDURE — 97110 THERAPEUTIC EXERCISES: CPT | Performed by: PHYSICAL THERAPIST

## 2024-07-17 NOTE — PROGRESS NOTES
independently ascend and descend steps with reciprocal pattern demonstrating good control and balance using rails only for balance.   Improve LEFS to 55/80 demonstrating min functional restrictions with ADLs, work and athletic activities.     [employer]  Access Code: [email or web address]  URL: [email or web address]  Date: 06/24/2024  Prepared by: Mary Gardner    Program Notes  seated leg lift with knee fully extended, 3x5    Exercises  - Seated Hamstring Stretch  - 1 x daily - 1 sets - 3 reps - 30 hold  - Seated Knee Flexion Stretch  - 2 x daily - 1 sets - 3 reps - 30 hold  - Supine Knee Extension Strengthening  - 1 x daily - 2 sets - 15 reps - 5 hold  - Beginner Bridge  - 5 x weekly - 2 sets - 10 reps - 5 hold  - Sidelying Hip Abduction  - 5 x weekly - 2 sets - 8 reps - 3 sec hold  - Active Straight Leg Raise with Quad Set  - 2 x daily - 2-3 sets - 5 reps - 3 hold  - Standing Heel Raises  - 5 x weekly - 2 sets - 15 reps - 5 hold  - Sit to Stand  - 5 x weekly - 2 sets - 10 reps  - Forward Step Up  - 4-5 x weekly - 2 sets - 8 reps

## 2024-07-23 ENCOUNTER — TREATMENT (OUTPATIENT)
Age: 59
End: 2024-07-23
Payer: COMMERCIAL

## 2024-07-23 DIAGNOSIS — R26.2 DIFFICULTY WALKING: ICD-10-CM

## 2024-07-23 DIAGNOSIS — M62.81 MUSCLE WEAKNESS: ICD-10-CM

## 2024-07-23 DIAGNOSIS — M25.561 ACUTE PAIN OF RIGHT KNEE: Primary | ICD-10-CM

## 2024-07-23 DIAGNOSIS — M25.661 STIFFNESS OF RIGHT KNEE: ICD-10-CM

## 2024-07-23 PROCEDURE — 97110 THERAPEUTIC EXERCISES: CPT | Performed by: PHYSICAL THERAPIST

## 2024-07-23 NOTE — PROGRESS NOTES
Strengthening  - 1 x daily - 2 sets - 15 reps - 5 hold  - Beginner Bridge  - 5 x weekly - 2 sets - 10 reps - 5 hold  - Sidelying Hip Abduction  - 5 x weekly - 2 sets - 8 reps - 3 sec hold  - Active Straight Leg Raise with Quad Set  - 2 x daily - 2-3 sets - 5 reps - 3 hold  - Standing Heel Raises  - 5 x weekly - 2 sets - 15 reps - 5 hold  - Sit to Stand  - 5 x weekly - 2 sets - 10 reps  - Forward Step Up  - 4-5 x weekly - 2 sets - 8 reps

## 2024-07-25 ENCOUNTER — TREATMENT (OUTPATIENT)
Age: 59
End: 2024-07-25
Payer: COMMERCIAL

## 2024-07-25 DIAGNOSIS — M25.561 ACUTE PAIN OF RIGHT KNEE: Primary | ICD-10-CM

## 2024-07-25 DIAGNOSIS — M25.661 STIFFNESS OF RIGHT KNEE: ICD-10-CM

## 2024-07-25 DIAGNOSIS — M62.81 MUSCLE WEAKNESS: ICD-10-CM

## 2024-07-25 DIAGNOSIS — R26.2 DIFFICULTY WALKING: ICD-10-CM

## 2024-07-25 DIAGNOSIS — Z96.651 STATUS POST RIGHT KNEE REPLACEMENT: ICD-10-CM

## 2024-07-25 PROCEDURE — 97530 THERAPEUTIC ACTIVITIES: CPT | Performed by: PHYSICAL THERAPIST

## 2024-07-25 PROCEDURE — 97110 THERAPEUTIC EXERCISES: CPT | Performed by: PHYSICAL THERAPIST

## 2024-07-25 NOTE — PROGRESS NOTES
RLE.  Current Exercises Past Exercises (not performed today)   Assessment of tolerance of previous therapy session  Upright bike level 3 x 10 min (2 holes showing)   R unilateral leg press 50# 2x15  Bilateral hamstring curls 50# 3x10  R wall sit with L leg reaches to [employer] using SLS program- 30 sec x 4 with 30 sec rest between sets  Standing heel raises 2x15 w/ R weight shift  Standing TKE against long green band H5sec/relax x 2 min w/ LUE support (stick)  Bridge H20sec x 5  Standing hip flexion, teal R-loop, 2x10 B  Supine SLR H10sec x 10 NMES R quadriceps 10sec on/10sec off   W/ quad set x 2 min  W/ short arc quad x 2 min  W/ long arc quad x 1 min  W/ seated SLR x 10 reps   Isometric quad at knee extension machine, H10sec x 3 each (3 angles)Standing R quad burner with slight R knee flexion, LLE x 1 min  R single leg stance H20sec x 3 w/ intermittent UE support  Partial squat (sissy) 2x10 w/ BUE support  R knee flexion stretch on second step H10sec x 5  Slantboard stretch 3 x 30 sec   Prone knee flexion stretch       Therapeutic activities (35832) x 10 min using dynamic activities to improve function related to stairs/transfers.   6-inch lateral R step downs with BUE support, 2x10  Reciprocal steps up/down w/ 2 rails x 3  Sit-stand with L foot forward x 12- half speed descent      ASSESSMENT     Supine SLR improved slightly with 12 degree lag. Recommend pt continue partial wall slides at home and long arc quads to progress strength. Pt will see MD next week.      PLAN     Continue with quad focused strengthening and progress closed chain strengthening.  Effective Dates/Duration: 7/23/2024 TO 9/5/2024 (42 days).    Frequency: 2x/week   Interventions may include but are not limited to:   (27093) Therapeutic exercise to develop ROM, strength, endurance and flexibility  (66659) Therapeutic activities using dynamic activities to improve function  (89617) Gait training to address mechanics, proper step length and

## 2024-08-01 ENCOUNTER — OFFICE VISIT (OUTPATIENT)
Dept: ORTHOPEDIC SURGERY | Age: 59
End: 2024-08-01

## 2024-08-01 ENCOUNTER — TELEPHONE (OUTPATIENT)
Dept: ORTHOPEDIC SURGERY | Age: 59
End: 2024-08-01

## 2024-08-01 DIAGNOSIS — Z09 FOLLOW-UP EXAMINATION FOLLOWING SURGERY: Primary | ICD-10-CM

## 2024-08-01 PROCEDURE — 99024 POSTOP FOLLOW-UP VISIT: CPT | Performed by: ORTHOPAEDIC SURGERY

## 2024-08-01 RX ORDER — DICLOFENAC SODIUM 75 MG/1
75 TABLET, DELAYED RELEASE ORAL 2 TIMES DAILY
Qty: 60 TABLET | Refills: 3 | Status: SHIPPED | OUTPATIENT
Start: 2024-08-01

## 2024-08-01 NOTE — PROGRESS NOTES
[facility] PT INT [facility] ORTHOPAEDICS  [address] [place] [phone]  Dept: [phone]      Physical Therapy Daily Note     Referring MD: Ebenezer Aguilar MD  Diagnosis:     ICD-10-CM    1. Acute pain of right knee  M25.561       2. Stiffness of right knee  M25.661       3. Muscle weakness  M62.81       4. Difficulty walking  R26.2             Surgery: RIGHT KNEE TOTAL ARTHROPLASTY ROBOTIC Date: 5/6/2024   Therapy precautions: blood bourne illness  Co-morbidities affecting plan of care: Obesity, HIV, L TKA 2/7/24, umbilical hernia          Chief complaints/history of injury: Pt underwent R TKA 5/6/24 secondary to DJD with 1 night acute care stay followed by home health PT 5/31/24. No complications to date.   Describe current symptoms: Pain, stiffness, swelling, R knee, weakness RLE . L knee still feels a little stiff at times.   Patient Stated Goals: return to walking without device at least 10 min, yard work, improve community mobility    Payor: Payor: NAYANA /  /  /  Billing pattern: Commercial- substantial/midpoint time each CPT   In 145 Out 240  Total Timed Procedure Codes: 50 min, Total Time: 55 min Modifier needed: No  Episode visit count:  17     SUBJECTIVE     Patient reports that she has a \"little pain\" in her R knee and that she is planning on returning to work on 6/19/24. \"I really hope I can do it.\"  She states that she saw the doctor yesterday and recommended that she get a stationary bike for home use.    Medications: diclofenac started 8/1/24    OBJECTIVE     Treatment provided today:  Therapeutic exercise (10575) x 50 min to develop ROM, strength, endurance and flexibility RLE.  Current Exercises Past Exercises (not performed today)   Assessment of tolerance of previous therapy session  Upright bike level 3 x 10 min (2 holes showing)   Hamstring stretch 3x30 sec hold  Supine SLR  with quad set  x 10- 8 deg lag  SDLY hip abd 2x10  Bilateral leg press 80# 3x10  R unilateral leg press

## 2024-08-01 NOTE — PROGRESS NOTES
Patient ID:  Sierra Grace  [id number]  59 y.o.  1965    Today: August 1, 2024    CHIEF COMPLAINT:  Follow-up right total knee replacement    HISTORY:  The patient presents today for 11 week follow-up after knee replacement.  The patient is doing very well. The patient has completed the full one month course of DVT oral prophylaxis.  The patient is working with physical therapy to regain strength and motion and continues to progress nicely from this standpoint.  Continues to take medication appropriately.     PE:  Incision is examined which is well healed.  No erythema, induration or drainage. No significant fluid accumulation around the surgical site.  ROM is 0-130.  Overall the knee appears stable to varus/valgus stress throughout arc of motion.  Anterior drawer testing at 45 and 90º of flexion is stable.  Posterior drawer testing is stable.  Distally able to plantar and dorsiflex foot and ankle.  Sensation intact.  Limb is perfused.  No sign of DVT.    X-RAYS:  None      ASSESSMENT:  11 Weeks S/P Right Total Knee Replacement    PLAN:  Continue activity and weight bearing as tolerated.  Continue PT/OT to focus on strengthening and stretching.  Continue to take pain medication appropriately. The patient has previously been given a script for antibiotics to be taken before dental prophylaxis today. If needed they are given a prescription for a refill on their pain medication today.  I will plan to check them back in 10  weeks for a routine 4 month follow-up.    Signed By: Ebenezer Aguilar MD  August 1, 2024

## 2024-08-01 NOTE — TELEPHONE ENCOUNTER
She is going to need a note to return to work on the 19th. She can't use short term disability anymore and would have to use long term so she will have to go back.

## 2024-08-02 ENCOUNTER — TREATMENT (OUTPATIENT)
Age: 59
End: 2024-08-02
Payer: COMMERCIAL

## 2024-08-02 DIAGNOSIS — M25.661 STIFFNESS OF RIGHT KNEE: ICD-10-CM

## 2024-08-02 DIAGNOSIS — R26.2 DIFFICULTY WALKING: ICD-10-CM

## 2024-08-02 DIAGNOSIS — M25.561 ACUTE PAIN OF RIGHT KNEE: Primary | ICD-10-CM

## 2024-08-02 DIAGNOSIS — M62.81 MUSCLE WEAKNESS: ICD-10-CM

## 2024-08-02 PROCEDURE — 97110 THERAPEUTIC EXERCISES: CPT | Performed by: PHYSICAL THERAPIST

## 2024-08-05 ENCOUNTER — CLINICAL DOCUMENTATION (OUTPATIENT)
Dept: ORTHOPEDIC SURGERY | Age: 59
End: 2024-08-05

## 2024-08-06 ENCOUNTER — CLINICAL DOCUMENTATION (OUTPATIENT)
Dept: ORTHOPEDIC SURGERY | Age: 59
End: 2024-08-06

## 2024-08-07 ENCOUNTER — TREATMENT (OUTPATIENT)
Age: 59
End: 2024-08-07
Payer: COMMERCIAL

## 2024-08-07 DIAGNOSIS — M25.661 STIFFNESS OF RIGHT KNEE: ICD-10-CM

## 2024-08-07 DIAGNOSIS — M25.561 ACUTE PAIN OF RIGHT KNEE: Primary | ICD-10-CM

## 2024-08-07 DIAGNOSIS — R26.2 DIFFICULTY WALKING: ICD-10-CM

## 2024-08-07 DIAGNOSIS — M62.81 MUSCLE WEAKNESS: ICD-10-CM

## 2024-08-07 PROCEDURE — 97110 THERAPEUTIC EXERCISES: CPT | Performed by: PHYSICAL THERAPIST

## 2024-08-07 PROCEDURE — 97530 THERAPEUTIC ACTIVITIES: CPT | Performed by: PHYSICAL THERAPIST

## 2024-08-07 NOTE — PROGRESS NOTES
[facility] PT [facility] ORTHOPAED[facility]  [address] [place] [phone]  Dept: [phone]      Physical Therapy Daily Note     Referring MD: Ebenezer Aguilar MD  Diagnosis:     ICD-10-CM    1. Acute pain of right knee  M25.561       2. Stiffness of right knee  M25.661       3. Muscle weakness  M62.81       4. Difficulty walking  R26.2               Surgery: RIGHT KNEE TOTAL ARTHROPLASTY ROBOTIC Date: 5/6/2024   Therapy precautions: blood bourne illness  Co-morbidities affecting plan of care: Obesity, HIV, L TKA 2/7/24, umbilical hernia          Chief complaints/history of injury: Pt underwent R TKA 5/6/24 secondary to DJD with 1 night acute care stay followed by home health PT 5/31/24. No complications to date.   Describe current symptoms: Pain, stiffness, swelling, R knee, weakness RLE . L knee still feels a little stiff at times.   Patient Stated Goals: return to walking without device at least 10 min, yard work, improve community mobility    Payor: Payor: [employer] /  /  /  Billing pattern: Commercial- substantial/midpoint time each CPT   Total Timed Procedure Codes: 40 min, Total Time: 48 min Modifier needed: No  Episode visit count:  18     SUBJECTIVE     Pt will return to work 8/19/24. She notices less pain with diclofenac (oral). MD advised purchasing a bike but she is considering using her restorator instead. Pt hs been wearing her steel toe shoes at home to prepare for return to work.     Medications: no changes since last session    OBJECTIVE     Findings 6/3/2024  7/9/2024   7/23/24     LEFS score 35/80 46/80 50/80   LEFS % function 44% 58% 63%   R knee flexion AROM 105° 115° 119°   Supine SLR R 15° lag 15° lag 15° lag   Quad set Poor + Poor + fair   MMT R knee extension 3-/5 3-/5 3-/5   MMT hip flexion B 4/5 NT B 4+/5   MMT hip abduction B 4/5 NT B 4/5   MMT hip adduction R 3+, L 4 NT R 3+/5, L 4+/5   R Girth mid-patella 45.3 cm 44.1 cm 44 cm   Treatment provided today:  Therapeutic exercise

## 2024-08-09 ENCOUNTER — TREATMENT (OUTPATIENT)
Age: 59
End: 2024-08-09

## 2024-08-09 DIAGNOSIS — M62.81 MUSCLE WEAKNESS: ICD-10-CM

## 2024-08-09 DIAGNOSIS — M25.661 STIFFNESS OF RIGHT KNEE: ICD-10-CM

## 2024-08-09 DIAGNOSIS — Z96.651 STATUS POST RIGHT KNEE REPLACEMENT: ICD-10-CM

## 2024-08-09 DIAGNOSIS — R26.2 DIFFICULTY WALKING: ICD-10-CM

## 2024-08-09 DIAGNOSIS — M25.561 ACUTE PAIN OF RIGHT KNEE: Primary | ICD-10-CM

## 2024-08-09 DIAGNOSIS — M25.662 KNEE STIFFNESS, LEFT: ICD-10-CM

## 2024-08-09 NOTE — PROGRESS NOTES
[facility] PT INT [facility] ORTHOPAED[facility]  [address] [place] [phone]  Dept: [phone]      Physical Therapy Daily Note     Referring MD: Ebenezer Aguilar MD  Diagnosis:     ICD-10-CM    1. Acute pain of right knee  M25.561       2. Stiffness of right knee  M25.661       3. Muscle weakness  M62.81       4. Difficulty walking  R26.2       5. Status post right knee replacement [Z96.651]  Z96.651       6. Knee stiffness, left  M25.662         Surgery: RIGHT KNEE TOTAL ARTHROPLASTY ROBOTIC Date: 5/6/2024   Therapy precautions: blood bourne illness  Co-morbidities affecting plan of care: Obesity, HIV, L TKA 2/7/24, umbilical hernia          Chief complaints/history of injury: Pt underwent R TKA 5/6/24 secondary to DJD with 1 night acute care stay followed by home health PT 5/31/24. No complications to date.   Describe current symptoms: Pain, stiffness, swelling, R knee, weakness RLE . L knee still feels a little stiff at times.   Patient Stated Goals: return to walking without device at least 10 min, yard work, improve community mobility    Payor: Payor: [employer] /  /  /  Billing pattern: Commercial- substantial/midpoint time each CPT   Total Timed Procedure Codes: 40 min, Total Time: 45 min Modifier needed: No  Episode visit count:  19     SUBJECTIVE     Pt has been standing up to 4 hours in steel toe boots in preparation for return to work.     Medications: no changes since last session    OBJECTIVE     Findings 6/3/2024  7/9/2024   7/23/24     LEFS score 35/80 46/80 50/80   LEFS % function 44% 58% 63%   R knee flexion AROM 105° 115° 119°   Supine SLR R 15° lag 15° lag 15° lag   Quad set Poor + Poor + fair   MMT R knee extension 3-/5 3-/5 3-/5   MMT hip flexion B 4/5 NT B 4+/5   MMT hip abduction B 4/5 NT B 4/5   MMT hip adduction R 3+, L 4 NT R 3+/5, L 4+/5   R Girth mid-patella 45.3 cm 44.1 cm 44 cm   Treatment provided today:  Therapeutic exercise (88603) x 30 min to develop ROM, strength, endurance

## 2024-08-13 ENCOUNTER — TREATMENT (OUTPATIENT)
Age: 59
End: 2024-08-13
Payer: COMMERCIAL

## 2024-08-13 DIAGNOSIS — M25.661 STIFFNESS OF RIGHT KNEE: ICD-10-CM

## 2024-08-13 DIAGNOSIS — M62.81 MUSCLE WEAKNESS: ICD-10-CM

## 2024-08-13 DIAGNOSIS — Z96.651 STATUS POST RIGHT KNEE REPLACEMENT: ICD-10-CM

## 2024-08-13 DIAGNOSIS — R26.2 DIFFICULTY WALKING: ICD-10-CM

## 2024-08-13 DIAGNOSIS — M25.561 ACUTE PAIN OF RIGHT KNEE: Primary | ICD-10-CM

## 2024-08-13 PROCEDURE — 97110 THERAPEUTIC EXERCISES: CPT | Performed by: PHYSICAL THERAPIST

## 2024-08-13 PROCEDURE — 97530 THERAPEUTIC ACTIVITIES: CPT | Performed by: PHYSICAL THERAPIST

## 2024-08-13 NOTE — PROGRESS NOTES
exercise to develop ROM, strength, endurance and flexibility  (57547) Therapeutic activities using dynamic activities to improve function  (49722) Gait training to address mechanics, proper step length and weight shifting to improve household and community mobility as well as overall safety with ADLs  (31631) Manual therapy techniques to improve joint and/or soft tissue mobility, ROM, and function as well as helping to decrease pain/spasms and swelling  Home exercise program (HEP) development    GOALS     Goals:  Short term goals to be met by 7/1/2024  (4 weeks):  Pt will demonstrate good recall of HEP requiring minimal verbal cuing for proper form and technique. Goal Met 7/9/2024   Pt will decrease swelling at involved LE/knee by 1 cm at mid-patella, which contributes to greater ROM and less pain with ADLs. Goal Met 7/9/2024   Increase knee AROM of involved LE to 115 degrees, in order to sit more comfortably. Goal Met 7/9/2024   Pt will demonstrate independent ambulation without device on even terrain for household distances. Goal Met 7/9/2024   Pt will ascend steps with a reciprocal pattern and descend with step to gait using rails for balance only, modified independent. Goal Not Met 7/9/2024 - Continue   Improve LEFS to = 45/80, showing gains in ADLs and daily tasks. Goal Met 7/9/2024   Pt will perform independent supine SLR without lag x 30 reps.Goal Not Met 7/23/2024 - Continue     Modified short term goals to be met by 8/13/24:  Pt will complete 4-inch step ups R with good control. Goal Met 8/13/2024   Pt will perform supine R SLR with </=8 degree extension lag. Goal Met 8/13/2024     Long term goals to be met by 8/22/24  Pt will be compliant and independent with a comprehensive HEP and activity progression.   Demonstrate AROM of involved knee to be 120 degrees, allowing for ADLs with min restrictions related to knee stiffness.    Pt will increase LE strength to at least 5/5 with MMT for improved maximal

## 2024-08-14 NOTE — PROGRESS NOTES
[facility]  [address] [place] [id number]  Dept: [phone]      Physical Therapy Daily Note     Referring MD: Ebenezer Aguilar MD  Diagnosis:     ICD-10-CM    1. Acute pain of right knee  M25.561       2. Stiffness of right knee  M25.661       3. Muscle weakness  M62.81       4. Difficulty walking  R26.2         Surgery: RIGHT KNEE TOTAL ARTHROPLASTY ROBOTIC Date: 5/6/2024   Therapy precautions: blood bourne illness  Co-morbidities affecting plan of care: Obesity, HIV, L TKA 2/7/24, umbilical hernia          Chief complaints/history of injury: Pt underwent R TKA 5/6/24 secondary to DJD with 1 night acute care stay followed by home health PT 5/31/24. No complications to date.   Describe current symptoms: Pain, stiffness, swelling, R knee, weakness RLE . L knee still feels a little stiff at times.   Patient Stated Goals: return to walking without device at least 10 min, yard work, improve community mobility    Payor: Payor: NAYANA /  /  /  Billing pattern: Commercial- substantial/midpoint time each CPT   Total Timed Procedure Codes: 40 min, Total Time: 46 min Modifier needed: No  Episode visit count:  21     SUBJECTIVE     Pt reports that she is having a good day.     Medications: no changes since last session    OBJECTIVE     Findings 6/3/2024  7/9/2024   7/23/24     LEFS score 35/80 46/80 50/80   LEFS % function 44% 58% 63%   R knee flexion AROM 105° 115° 119°   Supine SLR R 15° lag 15° lag 15° lag   Quad set Poor + Poor + fair   MMT R knee extension 3-/5 3-/5 3-/5   MMT hip flexion B 4/5 NT B 4+/5   MMT hip abduction B 4/5 NT B 4/5   MMT hip adduction R 3+, L 4 NT R 3+/5, L 4+/5   R Girth mid-patella 45.3 cm 44.1 cm 44 cm   Treatment provided today:  Therapeutic exercise (34534) x 25 min to develop ROM, strength, endurance and flexibility RLE.  Current Exercises Past Exercises (not performed today)   Assessment of tolerance of previous therapy session  Upright bike level

## 2024-08-15 ENCOUNTER — TREATMENT (OUTPATIENT)
Age: 59
End: 2024-08-15
Payer: COMMERCIAL

## 2024-08-15 DIAGNOSIS — M25.561 ACUTE PAIN OF RIGHT KNEE: Primary | ICD-10-CM

## 2024-08-15 DIAGNOSIS — R26.2 DIFFICULTY WALKING: ICD-10-CM

## 2024-08-15 DIAGNOSIS — M25.661 STIFFNESS OF RIGHT KNEE: ICD-10-CM

## 2024-08-15 DIAGNOSIS — M62.81 MUSCLE WEAKNESS: ICD-10-CM

## 2024-08-15 PROCEDURE — 97530 THERAPEUTIC ACTIVITIES: CPT | Performed by: PHYSICAL THERAPIST

## 2024-08-15 PROCEDURE — 97110 THERAPEUTIC EXERCISES: CPT | Performed by: PHYSICAL THERAPIST

## 2024-08-21 ENCOUNTER — TREATMENT (OUTPATIENT)
Age: 59
End: 2024-08-21
Payer: COMMERCIAL

## 2024-08-21 DIAGNOSIS — M25.661 STIFFNESS OF RIGHT KNEE: ICD-10-CM

## 2024-08-21 DIAGNOSIS — M62.81 MUSCLE WEAKNESS: ICD-10-CM

## 2024-08-21 DIAGNOSIS — R26.2 DIFFICULTY WALKING: ICD-10-CM

## 2024-08-21 DIAGNOSIS — M25.561 ACUTE PAIN OF RIGHT KNEE: Primary | ICD-10-CM

## 2024-08-21 PROCEDURE — 97530 THERAPEUTIC ACTIVITIES: CPT | Performed by: PHYSICAL THERAPIST

## 2024-08-21 PROCEDURE — 97110 THERAPEUTIC EXERCISES: CPT | Performed by: PHYSICAL THERAPIST

## 2024-08-21 NOTE — PROGRESS NOTES
flexion B 4/5 NT B 4+/5   MMT hip abduction B 4/5 NT B 4/5   MMT hip adduction R 3+, L 4 NT R 3+/5, L 4+/5   R Girth mid-patella 45.3 cm 44.1 cm 44 cm   Treatment provided today:  Therapeutic exercise (73957) x 25 min to develop ROM, strength, endurance and flexibility RLE.  Current Exercises Past Exercises (not performed today)   Assessment of tolerance of previous therapy session  Upright bike level 3 x 7 min (2 holes showing)   Supine SLR H10sec x 10  R unilateral leg press 60# 3x10  Bilateral hamstring curls 60# 3x8  Hookying Bridge with isometric hip add H15sec x 8  Eccentric R heel raises 2x8  R wall sit with L leg reaches to Blaze [employer]s using SLS program- 30 sec x 4 with 30 sec rest between sets  Hamstring stretch 4x30 sec hold   NMES R quadriceps 10sec on/10sec off   W/ quad set x 2 min  W/ short arc quad x 2 min  W/ long arc quad x 1 min  W/ seated SLR x 10 reps   Bilateral leg press 90# [id number] quad at knee extension machine, H10sec x 3 each (3 angles)R single leg squat with reaches to blaze pods using one leg balance program, 4x30 sec  Standing R quad burner with slight R knee flexion, LLE x 1 min  R single leg stance H20sec x 3 w/ intermittent UE support  Partial squat (sissy) 2x10 w/ BUE support  R knee flexion stretch on second step H10sec x 5  Slantboard stretch 3 x 30 sec   Prone knee flexion stretch  Standing hip flexion, teal R-loop, 2x10 B  Partial squat to TKE against maroon band to SLS H5sec, 2x10       Therapeutic activities (13623) x 15 min using dynamic activities to improve function related to stairs/transfers.   6-inch lateral R step downs with BUE support, 2x10  Reciprocal steps up/down w/ 2 rails x 4  Anterior step down from 6-inch step leading L 2x10  Sit-stand with L foot forward x 20- half speed descent 20-inch platform)    ASSESSMENT     Recommend pt park closer to building entrance. Quad strength improving with <5 degree lag in SLR. Pt did well with return to work and recommend

## 2024-08-23 ENCOUNTER — TREATMENT (OUTPATIENT)
Age: 59
End: 2024-08-23

## 2024-08-23 DIAGNOSIS — M62.81 MUSCLE WEAKNESS: ICD-10-CM

## 2024-08-23 DIAGNOSIS — M25.561 ACUTE PAIN OF RIGHT KNEE: Primary | ICD-10-CM

## 2024-08-23 DIAGNOSIS — R26.2 DIFFICULTY WALKING: ICD-10-CM

## 2024-08-23 DIAGNOSIS — M25.661 STIFFNESS OF RIGHT KNEE: ICD-10-CM

## 2024-08-23 NOTE — PROGRESS NOTES
[facility]  [address] [place] [id number]  Dept: [phone]      Physical Therapy Daily Note     Referring MD: Ebenezer Aguilar MD  Diagnosis:     ICD-10-CM    1. Acute pain of right knee  M25.561       2. Stiffness of right knee  M25.661       3. Muscle weakness  M62.81       4. Difficulty walking  R26.2           Surgery: RIGHT KNEE TOTAL ARTHROPLASTY ROBOTIC Date: 5/6/2024   Therapy precautions: blood bourne illness  Co-morbidities affecting plan of care: Obesity, HIV, L TKA 2/7/24, umbilical hernia          Chief complaints/history of injury: Pt underwent R TKA 5/6/24 secondary to DJD with 1 night acute care stay followed by home health PT 5/31/24. No complications to date.   Describe current symptoms: Pain, stiffness, swelling, R knee, weakness RLE . L knee still feels a little stiff at times.   Patient Stated Goals: return to walking without device at least 10 min, yard work, improve community mobility    Payor: Payor: NAYANA /  /  /  Billing pattern: Commercial- substantial/midpoint time each CPT   Total Timed Procedure Codes: 40 min, Total Time: 45 min Modifier needed: No  Episode visit count:  23     SUBJECTIVE     Pt reports that her knee is not very swollen today but feels very stiff. Pain increased with return to work.     Medications: no changes since last session    OBJECTIVE     Findings 6/3/2024  7/9/2024   7/23/24   8/23/24   LEFS score 35/80 46/80 50/80    LEFS % function 44% 58% 63%    R knee flexion AROM 105° 115° 119°    Supine SLR R 15° lag 15° lag 15° lag 5° lag   Quad set Poor + Poor + fair good   MMT R knee extension 3-/5 3-/5 3-/5 4/5   MMT hip flexion B 4/5 NT B 4+/5 B 5/5   MMT hip abduction B 4/5 NT B 4/5 B 4+/5   MMT hip adduction R 3+, L 4 NT R 3+/5, L 4+/5 R 4/5, L 4+/5   R Girth mid-patella 45.3 cm 44.1 cm 44 cm    Treatment provided today:  Therapeutic exercise (40300) x 30 min to develop ROM, strength, endurance and flexibility

## 2024-08-30 ENCOUNTER — TELEPHONE (OUTPATIENT)
Age: 59
End: 2024-08-30

## 2024-08-30 NOTE — TELEPHONE ENCOUNTER
Called pt to discuss continued HEP after discharge from PT as last appt cancelled. Left voicemail.

## 2024-08-30 NOTE — TELEPHONE ENCOUNTER
[facility] - [facility]  [address] [place] [id number]  Dept: [phone]      Physical Therapy Discharge/Discontinuation Note       Patient has been discharged from therapy based on  working independently and doing well .    Patient was  last seen for PT services on 8/23/2024.  At that time, the patient appeared to be progressing well with therapy treatment.  Therapy goals were partially met.  Please see previous notes for objective findings. Pt had 1 more session scheduled with planned discharge but cancelled due to illness. Spoke with patient by phone and knee is doing fairly well at work. Reminded patient that it will take several weeks to build up endurance for the long shifts and to continue ice/elevation at the end of the day. Recommend continued HEP 2-3 days/week. Pt states understanding.       [x] All POC signed:  Go to Notes / Go to Scanned Items  [x] Send message to Auth team to close referral: Go to InBox   [x] Episode Resolved:  Go to Episode  [x] All remaining visits canceled:  Go to Appt Desk  No future appointments.

## 2024-09-11 ENCOUNTER — TELEPHONE (OUTPATIENT)
Dept: ORTHOPEDIC SURGERY | Age: 59
End: 2024-09-11

## 2024-10-09 ENCOUNTER — TELEPHONE (OUTPATIENT)
Dept: ORTHOPEDIC SURGERY | Age: 59
End: 2024-10-09

## 2024-10-09 NOTE — TELEPHONE ENCOUNTER
LM for pt to call the office back. Wanted to ask how pt was feeling and ask if they would like to reschedule their cancelled apt.

## 2025-04-12 ENCOUNTER — HOSPITAL ENCOUNTER (OUTPATIENT)
Dept: MAMMOGRAPHY | Age: 60
Discharge: HOME | End: 2025-04-15
Payer: COMMERCIAL

## 2025-04-12 VITALS — BODY MASS INDEX: 35.08 KG/M2 | HEIGHT: 63 IN | WEIGHT: 198 LBS

## 2025-04-12 DIAGNOSIS — Z12.31 VISIT FOR SCREENING MAMMOGRAM: ICD-10-CM

## 2025-04-12 PROCEDURE — 77067 SCR MAMMO BI INCL CAD: CPT

## (undated) DEVICE — SOLUTION IRRIG 1000ML 0.9% SOD CHL USP POUR PLAS BTL

## (undated) DEVICE — KIT DRP FOR RIO ROBOTIC ARM ASST SYS

## (undated) DEVICE — SOLUTION IRRIG 1000ML STRL H2O USP PLAS POUR BTL

## (undated) DEVICE — GLOVE SURG SZ 8 L12IN FNGR THK79MIL GRN LTX FREE

## (undated) DEVICE — PREVENA INCISION MANAGEMENT SYSTEM- PEEL & PLACE DRESSING: Brand: PREVENA™ PEEL & PLACE™

## (undated) DEVICE — SUTURE ABSORBABLE MONOFILAMENT 1 CTX 36 CM 48 MM VIO PDS +

## (undated) DEVICE — 450 ML BOTTLE OF 0.05% CHLORHEXIDINE GLUCONATE IN 99.95% STERILE WATER FOR IRRIGATION, USP AND APPLICATOR.: Brand: IRRISEPT ANTIMICROBIAL WOUND LAVAGE

## (undated) DEVICE — HOOD: Brand: T7PLUS

## (undated) DEVICE — KIT INT FIX FEM TIB CKPT MAKOPLASTY

## (undated) DEVICE — GLOVE ORANGE PI 8   MSG9080

## (undated) DEVICE — DRAPE,TOP,102X53,STERILE: Brand: MEDLINE

## (undated) DEVICE — STERILE PRESSURE PROTECTOR PAD® FOR DE MAYO UNIVERSAL DISTRACTOR® (10/CASE): Brand: DE MAYO UNIVERSAL DISTRACTOR®

## (undated) DEVICE — GLOVE SURG SZ 65 THK91MIL LTX FREE SYN POLYISOPRENE

## (undated) DEVICE — BIPOLAR SEALER 23-112-1 AQM 6.0: Brand: AQUAMANTYS ®

## (undated) DEVICE — YANKAUER,FLEXIBLE HANDLE,REGLR CAPACITY: Brand: MEDLINE INDUSTRIES, INC.

## (undated) DEVICE — PIN BNE FIX TEMP L140MM DIA4MM MAKO

## (undated) DEVICE — STERILE SYNTHETIC POLYISOPRENE POWDER-FREE SURGICAL GLOVES WITH HYDROGEL COATING, SMOOTH FINISH, STRAIGHT FINGER: Brand: PROTEXIS

## (undated) DEVICE — SUTURE MONOCRYL STRATAFIX SPRL + SZ 2-0 L18IN ABSRB UD CT-1 SXMP1B413

## (undated) DEVICE — GLOVE SURG SZ 65 L12IN FNGR THK79MIL GRN LTX FREE

## (undated) DEVICE — SOLUTION IRRIG 3000ML 0.9% SOD CHL USP UROMATIC PLAS CONT

## (undated) DEVICE — SOLUTION IV 250ML 0.9% SOD CHL PH 5 INJ USP VIAFLX PLAS

## (undated) DEVICE — PIN BNE FIX TEMP L110MM DIA4MM MAKO

## (undated) DEVICE — TOTAL KNEE DR WATSON: Brand: MEDLINE INDUSTRIES, INC.

## (undated) DEVICE — STERILE PVP: Brand: MEDLINE INDUSTRIES, INC.

## (undated) DEVICE — SUTURE MONOCRYL SZ 2-0 L27IN ABSRB UD CP-1 1 L36MM 1/2 CIR REV Y266H

## (undated) DEVICE — BLADE SURG SAW S STL NAR OSC W/ SERR EDGE DISP

## (undated) DEVICE — KIT TRK KNEE PROC VIZADISC

## (undated) DEVICE — SUTURE MCRYL SZ 2-0 L27IN ABSRB UD CP-1 1 L36MM 1/2 CIR REV Y266H

## (undated) DEVICE — SUTURE STRATAFIX SPRL MCRYL + SZ 2-0 L18IN ABSRB UD CT-1 SXMP1B413

## (undated) DEVICE — STRYKER PERFORMANCE SERIES SAGITTAL BLADE: Brand: STRYKER PERFORMANCE SERIES

## (undated) DEVICE — BLADE SURG SAW STD S STL OSC W/ SERR EDGE DISP